# Patient Record
Sex: FEMALE | Race: BLACK OR AFRICAN AMERICAN | Employment: UNEMPLOYED | URBAN - METROPOLITAN AREA
[De-identification: names, ages, dates, MRNs, and addresses within clinical notes are randomized per-mention and may not be internally consistent; named-entity substitution may affect disease eponyms.]

---

## 2018-12-21 ENCOUNTER — APPOINTMENT (OUTPATIENT)
Dept: MRI IMAGING | Age: 43
DRG: 066 | End: 2018-12-21
Attending: NURSE PRACTITIONER
Payer: SELF-PAY

## 2018-12-21 ENCOUNTER — HOSPITAL ENCOUNTER (INPATIENT)
Age: 43
LOS: 3 days | Discharge: HOME HEALTH | DRG: 066 | End: 2018-12-24
Attending: EMERGENCY MEDICINE | Admitting: INTERNAL MEDICINE
Payer: SELF-PAY

## 2018-12-21 ENCOUNTER — APPOINTMENT (OUTPATIENT)
Dept: GENERAL RADIOLOGY | Age: 43
DRG: 066 | End: 2018-12-21
Attending: NURSE PRACTITIONER
Payer: SELF-PAY

## 2018-12-21 DIAGNOSIS — I63.81 LEFT THALAMIC INFARCTION (HCC): ICD-10-CM

## 2018-12-21 DIAGNOSIS — I10 HYPERTENSION, UNSPECIFIED TYPE: Primary | ICD-10-CM

## 2018-12-21 DIAGNOSIS — E87.1 HYPONATREMIA: ICD-10-CM

## 2018-12-21 DIAGNOSIS — E87.6 HYPOKALEMIA: ICD-10-CM

## 2018-12-21 DIAGNOSIS — R20.0 NUMBNESS: ICD-10-CM

## 2018-12-21 LAB
ALBUMIN SERPL-MCNC: 3.4 G/DL (ref 3.4–5)
ALBUMIN/GLOB SERPL: 0.8 {RATIO} (ref 0.8–1.7)
ALP SERPL-CCNC: 84 U/L (ref 45–117)
ALT SERPL-CCNC: 14 U/L (ref 13–56)
ANION GAP SERPL CALC-SCNC: 7 MMOL/L (ref 3–18)
APPEARANCE UR: CLEAR
APTT PPP: 29.2 SEC (ref 23–36.4)
AST SERPL-CCNC: 10 U/L (ref 15–37)
BASOPHILS # BLD: 0 K/UL (ref 0–0.1)
BASOPHILS NFR BLD: 0 % (ref 0–2)
BILIRUB SERPL-MCNC: 0.5 MG/DL (ref 0.2–1)
BILIRUB UR QL: NEGATIVE
BUN SERPL-MCNC: 10 MG/DL (ref 7–18)
BUN/CREAT SERPL: 12
CALCIUM SERPL-MCNC: 8.5 MG/DL (ref 8.5–10.1)
CHLORIDE SERPL-SCNC: 99 MMOL/L (ref 100–108)
CK MB CFR SERPL CALC: NORMAL % (ref 0–4)
CK MB SERPL-MCNC: <1 NG/ML (ref 5–25)
CK SERPL-CCNC: 95 U/L (ref 26–192)
CO2 SERPL-SCNC: 27 MMOL/L (ref 21–32)
COLOR UR: YELLOW
CREAT SERPL-MCNC: 0.81 MG/DL (ref 0.6–1.3)
DIFFERENTIAL METHOD BLD: ABNORMAL
EOSINOPHIL # BLD: 0.1 K/UL (ref 0–0.4)
EOSINOPHIL NFR BLD: 1 % (ref 0–5)
ERYTHROCYTE [DISTWIDTH] IN BLOOD BY AUTOMATED COUNT: 13.9 % (ref 11.6–14.5)
GLOBULIN SER CALC-MCNC: 4.1 G/DL (ref 2–4)
GLUCOSE SERPL-MCNC: 324 MG/DL (ref 74–99)
GLUCOSE UR STRIP.AUTO-MCNC: >1000 MG/DL
HCG UR QL: NEGATIVE
HCT VFR BLD AUTO: 35 % (ref 35–45)
HGB BLD-MCNC: 10.9 G/DL (ref 12–16)
HGB UR QL STRIP: NEGATIVE
INR PPP: 1 (ref 0.8–1.2)
KETONES UR QL STRIP.AUTO: NEGATIVE MG/DL
LEUKOCYTE ESTERASE UR QL STRIP.AUTO: NEGATIVE
LYMPHOCYTES # BLD: 2.8 K/UL (ref 0.9–3.6)
LYMPHOCYTES NFR BLD: 39 % (ref 21–52)
MCH RBC QN AUTO: 22.8 PG (ref 24–34)
MCHC RBC AUTO-ENTMCNC: 31.1 G/DL (ref 31–37)
MCV RBC AUTO: 73.2 FL (ref 74–97)
MONOCYTES # BLD: 0.4 K/UL (ref 0.05–1.2)
MONOCYTES NFR BLD: 6 % (ref 3–10)
NEUTS SEG # BLD: 3.8 K/UL (ref 1.8–8)
NEUTS SEG NFR BLD: 54 % (ref 40–73)
NITRITE UR QL STRIP.AUTO: NEGATIVE
PH UR STRIP: 6.5 [PH] (ref 5–8)
PLATELET # BLD AUTO: 283 K/UL (ref 135–420)
PMV BLD AUTO: 11 FL (ref 9.2–11.8)
POTASSIUM SERPL-SCNC: 3 MMOL/L (ref 3.5–5.5)
PROT SERPL-MCNC: 7.5 G/DL (ref 6.4–8.2)
PROT UR STRIP-MCNC: NEGATIVE MG/DL
PROTHROMBIN TIME: 12.5 SEC (ref 11.5–15.2)
RBC # BLD AUTO: 4.78 M/UL (ref 4.2–5.3)
RBC MORPH BLD: ABNORMAL
RBC MORPH BLD: ABNORMAL
SODIUM SERPL-SCNC: 133 MMOL/L (ref 136–145)
SP GR UR REFRACTOMETRY: 1.02 (ref 1–1.03)
TROPONIN I SERPL-MCNC: <0.02 NG/ML (ref 0–0.04)
UROBILINOGEN UR QL STRIP.AUTO: 0.2 EU/DL (ref 0.2–1)
WBC # BLD AUTO: 7.1 K/UL (ref 4.6–13.2)

## 2018-12-21 PROCEDURE — 83036 HEMOGLOBIN GLYCOSYLATED A1C: CPT

## 2018-12-21 PROCEDURE — 74011250636 HC RX REV CODE- 250/636: Performed by: NURSE PRACTITIONER

## 2018-12-21 PROCEDURE — 82550 ASSAY OF CK (CPK): CPT

## 2018-12-21 PROCEDURE — 65660000000 HC RM CCU STEPDOWN

## 2018-12-21 PROCEDURE — 74011250636 HC RX REV CODE- 250/636: Performed by: EMERGENCY MEDICINE

## 2018-12-21 PROCEDURE — 93005 ELECTROCARDIOGRAM TRACING: CPT

## 2018-12-21 PROCEDURE — 96374 THER/PROPH/DIAG INJ IV PUSH: CPT

## 2018-12-21 PROCEDURE — 96361 HYDRATE IV INFUSION ADD-ON: CPT

## 2018-12-21 PROCEDURE — 71045 X-RAY EXAM CHEST 1 VIEW: CPT

## 2018-12-21 PROCEDURE — 74011250637 HC RX REV CODE- 250/637: Performed by: EMERGENCY MEDICINE

## 2018-12-21 PROCEDURE — 85730 THROMBOPLASTIN TIME PARTIAL: CPT

## 2018-12-21 PROCEDURE — 81003 URINALYSIS AUTO W/O SCOPE: CPT

## 2018-12-21 PROCEDURE — 99285 EMERGENCY DEPT VISIT HI MDM: CPT

## 2018-12-21 PROCEDURE — 70551 MRI BRAIN STEM W/O DYE: CPT

## 2018-12-21 PROCEDURE — 85025 COMPLETE CBC W/AUTO DIFF WBC: CPT

## 2018-12-21 PROCEDURE — 96376 TX/PRO/DX INJ SAME DRUG ADON: CPT

## 2018-12-21 PROCEDURE — 85610 PROTHROMBIN TIME: CPT

## 2018-12-21 PROCEDURE — 80053 COMPREHEN METABOLIC PANEL: CPT

## 2018-12-21 PROCEDURE — 81025 URINE PREGNANCY TEST: CPT

## 2018-12-21 RX ORDER — LABETALOL HCL 20 MG/4 ML
20 SYRINGE (ML) INTRAVENOUS
Status: COMPLETED | OUTPATIENT
Start: 2018-12-21 | End: 2018-12-21

## 2018-12-21 RX ORDER — GUAIFENESIN 100 MG/5ML
324 LIQUID (ML) ORAL
Status: COMPLETED | OUTPATIENT
Start: 2018-12-21 | End: 2018-12-21

## 2018-12-21 RX ORDER — SODIUM CHLORIDE 9 MG/ML
1000 INJECTION, SOLUTION INTRAVENOUS ONCE
Status: COMPLETED | OUTPATIENT
Start: 2018-12-21 | End: 2018-12-21

## 2018-12-21 RX ORDER — LABETALOL HCL 20 MG/4 ML
20 SYRINGE (ML) INTRAVENOUS ONCE
Status: COMPLETED | OUTPATIENT
Start: 2018-12-21 | End: 2018-12-21

## 2018-12-21 RX ADMIN — SODIUM CHLORIDE 1000 ML: 900 INJECTION, SOLUTION INTRAVENOUS at 20:40

## 2018-12-21 RX ADMIN — POTASSIUM BICARBONATE 50 MEQ: 25 TABLET, EFFERVESCENT ORAL at 20:39

## 2018-12-21 RX ADMIN — ASPIRIN 81 MG 324 MG: 81 TABLET ORAL at 21:12

## 2018-12-21 RX ADMIN — LABETALOL HYDROCHLORIDE 20 MG: 5 INJECTION, SOLUTION INTRAVENOUS at 21:18

## 2018-12-21 RX ADMIN — LABETALOL HYDROCHLORIDE 20 MG: 5 INJECTION, SOLUTION INTRAVENOUS at 18:48

## 2018-12-21 NOTE — ED NOTES
Patient states she has nose piercing that cannot be removed, information noted on MRI check list, MRI called to notify them of nose piercing, no answer on MRI phone number provided by , will continue to attempt

## 2018-12-21 NOTE — ED PROVIDER NOTES
EMERGENCY DEPARTMENT HISTORY AND PHYSICAL EXAM    Date: 12/21/2018  Patient Name: Salma Combs    History of Presenting Illness     Chief Complaint   Patient presents with    Numbness         History Provided By: Patient    Chief Complaint: Numbness  Duration: 1 Days  Timing:  Acute  Location: right hand and foot  Quality: numbness  Associated Symptoms: right facial numbness at her lips    Additional History (Context):   5:30 PM  Salma Combs is a 39 y.o. female with PMHX of diabetes and HTN who presents to the emergency department C/O right foot and hand numbness onset 23.5 hours ago at 6 PM. Associated sxs include right facial numbness at her lips. Patient reports she has not taken her blood pressure mediation in 8 months secondary to insurance issues and recently moving here. Endorses social drinking. Pt denies weakness, chest pain, SOB, any pain, tobacco use, and any other sxs or complaints. PCP: None        Past History     Past Medical History:  Past Medical History:   Diagnosis Date    Diabetes ([address]     Hypercholesteremia     Hypertension        Past Surgical History:  Past Surgical History:   Procedure Laterality Date    HX GYN      c section       Family History:  History reviewed. No pertinent family history. Social History:  Social History     Tobacco Use    Smoking status: Never Smoker    Smokeless tobacco: Never Used   Substance Use Topics    Alcohol use: Yes     Comment: socially    Drug use: No       Allergies:  No Known Allergies      Review of Systems   Review of Systems   Respiratory: Negative for shortness of breath. Cardiovascular: Negative for chest pain. Neurological: Positive for numbness (right hand/foot, facial near lips). Negative for weakness. All other systems reviewed and are negative.       Physical Exam     Vitals:    12/21/18 2349 12/22/18 0139 12/22/18 0349 12/22/18 0540   BP: (!) 206/104 (!) 188/106 (!) 183/107 (!) 190/109   Pulse: 86 91 99 84   Resp: 16 16 16 16   Temp: 98.5 °F (36.9 °C) 98.6 °F (37 °C) 98.2 °F (36.8 °C) 98.3 °F (36.8 °C)   SpO2: 100%   98%   Weight:       Height:         Physical Exam   Constitutional: She is oriented to person, place, and time. She appears well-developed and well-nourished. Answering questions appropriately. Chronically ill appearing, NAD   HENT:   Head: Normocephalic and atraumatic. Eyes: Conjunctivae and EOM are normal. Pupils are equal, round, and reactive to light. Neck: Normal range of motion. Neck supple. Cardiovascular: Normal rate and regular rhythm. No murmur heard. Pulmonary/Chest: Effort normal and breath sounds normal.   Abdominal: Soft. Bowel sounds are normal. There is no tenderness. There is no rebound and no guarding. Musculoskeletal: Normal range of motion. Strong equal strength in bilateral upper and lower extremities. Admits to \"decreased sensation right hand and right foot\", sensation intact but reports \"feels weird and different\"  Negative upper and lower pronator drift. No facial droop noted. Negative finger to nose. Neurological: She is alert and oriented to person, place, and time. No cranial nerve deficit. Coordination normal.   Skin: Skin is warm and dry. Nursing note and vitals reviewed. Diagnostic Study Results     Labs -     Recent Results (from the past 12 hour(s))   GLUCOSE, POC    Collection Time: 12/22/18  5:39 AM   Result Value Ref Range    Glucose (POC) 303 (H) 70 - 110 mg/dL       Radiologic Studies -   XR CHEST PORT   Final Result   Impression:   --------------      No active pulmonary disease.       MRI BRAIN WO CONT    (Results Pending)   CTA NECK    (Results Pending)   CTA HEAD    (Results Pending)     10:31 PM  RADIOLOGY FINDINGS  Chest X-ray shows NAP  Pending review by Radiologist  Recorded by MELISSA Arvizuibomar, as dictated by Amado Kaplan MD      CT Results  (Last 48 hours)    None        CXR Results  (Last 48 hours)               12/21/18 1802  XR CHEST PORT Final result    Impression:  Impression:   --------------       No active pulmonary disease. Narrative:  ---------------------------------------------------------------------------   <<<<<<<<<           [facility]           >>>>>>>>>    ---------------------------------------------------------------------------       CLINICAL HISTORY:  Hypertension. Pain. COMPARISON EXAMINATIONS:  None. ---  SINGLE FRONTAL VIEW OF THE CHEST  ---       The lungs and pleural spaces are clear. The mediastinum is unremarkable in   appearance. No significant osseous abnormalities are identified.             --------------             Medications given in the ED-  Medications   influenza vaccine 2018-19 (6 mos+)(PF) (FLUARIX QUAD/FLULAVAL QUAD) injection 0.5 mL (not administered)   sodium chloride (NS) flush 5-10 mL (10 mL IntraVENous Given 12/22/18 0659)   sodium chloride (NS) flush 5-10 mL (not administered)   insulin lispro (HUMALOG) injection (8 Units SubCUTAneous Given 12/22/18 0655)   glucose chewable tablet 16 g (not administered)   glucagon (GLUCAGEN) injection 1 mg (not administered)   dextrose (D50W) injection syrg 12.5-25 g (not administered)   labetalol (NORMODYNE;TRANDATE) 20 mg/4 mL (5 mg/mL) injection 10 mg (not administered)   atorvastatin (LIPITOR) tablet 40 mg (not administered)   labetalol (NORMODYNE;TRANDATE) 20 mg/4 mL (5 mg/mL) injection 20 mg (20 mg IntraVENous Given 12/21/18 8578)   0.9% sodium chloride infusion 1,000 mL (0 mL IntraVENous IV Completed 12/21/18 2200)   potassium bicarbonate (KLYTE) tablet 50 mEq (50 mEq Oral Given 12/21/18 2039)   aspirin chewable tablet 324 mg (324 mg Oral Given 12/21/18 2112)   labetalol (NORMODYNE;TRANDATE) 20 mg/4 mL (5 mg/mL) injection 20 mg (20 mg IntraVENous Given 12/21/18 2118)         Medical Decision Making   I am the first provider for this patient.     I reviewed the vital signs, available nursing notes, past medical history, past surgical history, family history and social history. Vital Signs-Reviewed the patient's vital signs. Pulse Oximetry Analysis - 100% on RA     Cardiac Monitor:  Rate: 106 bpm  Rhythm: Sinus tachycardia    EKG interpretation: (Preliminary)  6:00 PM  94 BPM, NSR, no STEMi  EKG read by Yuridia DARLING at 6:06 PM     Records Reviewed: Nursing Notes and Old Medical Records    Provider Notes (Medical Decision Making): 50F. o female presents to the ED c/o right hand and foot \"numbness\" since yesterday. She has been out of her BP medications for 8 months and is unsure of the medications she is supposed to be on. No neuro-focal deficit on exam, discussed case with neuro who recommends MRI of the brain to r/o stroke and to treat BP. BP is decreasing after Labetolol. Care turned over to attending at change of shift to re-evaluate patient and wait for MRI results. Procedures:  Procedures    ED Course:   5:30 PM Initial assessment performed. The patients presenting problems have been discussed, and they are in agreement with the care plan formulated and outlined with them. I have encouraged them to ask questions as they arise throughout their visit. 6:00 PM Discussed patient's history, exam, and available diagnostics results with Orma Seip, MD, Neurology, who states to obtain MRI and if negative just  treat blood pressure. SIGN OUT:  8:13 PM  Patient's presentation, labs/imaging and plan of care was reviewed with Raji De La O MD as part of sign out. They will wait for MRI results as part of the plan discussed with the patient. Raji De La O MD's assistance in completion of this plan is greatly appreciated but it should be noted that I will be the provider of record for this patient. LISSET Wylie    9:21 PM Discussed patient's history, exam, and available diagnostics results with Kalen Mcneill DO, hospitalist, who agrees to admit.     Diagnosis and Disposition       Critical Care Time: None    Core Measures:  For Hospitalized Patients:    1. Hospitalization Decision Time:  The decision to hospitalize the patient was made by Nabor Chapin MD at 9:21 PM on 12/21/2018    2. Aspirin: Aspirin was given    9:21 PM  Patient is being admitted to the hospital by Vivian Cazares DO. The results of their tests and reasons for their admission have been discussed with them and/or available family. They convey agreement and understanding for the need to be admitted and for their admission diagnosis. CONDITIONS ON ADMISSION:  Sepsis is not present at the time of admission. Deep Vein Thrombosis is not present at the time of admission. Thrombosis is present at the time of admission. Urinary Tract Infection is not present at the time of admission. Pneumonia is not present at the time of admission. MRSA is not present at the time of admission. Wound infection is not present at the time of admission. Pressure Ulcer is not present at the time of admission. CLINICAL IMPRESSION:    1. Hypertension, unspecified type    2. Numbness    3. Left thalamic infarction ([address]    4. Hyponatremia    5. Hypokalemia        PLAN:  1. Admit to telemetry  _______________________________    Attestations: This note is prepared by Angela Bar and Catalina Woodall, acting as Scribe for [facility]. [facility]:  The scribe's documentation has been prepared under my direction and personally reviewed by me in its entirety. I confirm that the note above accurately reflects all work, treatment, procedures, and medical decision making performed by me. This note is prepared by Ozzie Preciado, acting as Scribe for Nabor Chapin MD.    Nabor Chapin MD:  The scribe's documentation has been prepared under my direction and personally reviewed by me in its entirety.   I confirm that the note above accurately reflects all work, treatment, procedures, and medical decision making performed by me.  _______________________________

## 2018-12-22 ENCOUNTER — APPOINTMENT (OUTPATIENT)
Dept: CT IMAGING | Age: 43
DRG: 066 | End: 2018-12-22
Attending: INTERNAL MEDICINE
Payer: SELF-PAY

## 2018-12-22 LAB
ANION GAP SERPL CALC-SCNC: 8 MMOL/L (ref 3–18)
BUN SERPL-MCNC: 10 MG/DL (ref 7–18)
BUN/CREAT SERPL: 14
CALCIUM SERPL-MCNC: 8.5 MG/DL (ref 8.5–10.1)
CHLORIDE SERPL-SCNC: 103 MMOL/L (ref 100–108)
CHOLEST SERPL-MCNC: 190 MG/DL
CO2 SERPL-SCNC: 24 MMOL/L (ref 21–32)
CREAT SERPL-MCNC: 0.71 MG/DL (ref 0.6–1.3)
EST. AVERAGE GLUCOSE BLD GHB EST-MCNC: 309 MG/DL
GLUCOSE BLD STRIP.AUTO-MCNC: 205 MG/DL (ref 70–110)
GLUCOSE BLD STRIP.AUTO-MCNC: 303 MG/DL (ref 70–110)
GLUCOSE BLD STRIP.AUTO-MCNC: 338 MG/DL (ref 70–110)
GLUCOSE SERPL-MCNC: 205 MG/DL (ref 74–99)
HBA1C MFR BLD: 12.4 % (ref 4.2–5.6)
HDLC SERPL-MCNC: 57 MG/DL (ref 40–60)
HDLC SERPL: 3.3 {RATIO} (ref 0–5)
LDLC SERPL CALC-MCNC: 113.2 MG/DL (ref 0–100)
LIPID PROFILE,FLP: ABNORMAL
POTASSIUM SERPL-SCNC: 3.8 MMOL/L (ref 3.5–5.5)
SODIUM SERPL-SCNC: 135 MMOL/L (ref 136–145)
TRIGL SERPL-MCNC: 99 MG/DL (ref ?–150)
VLDLC SERPL CALC-MCNC: 19.8 MG/DL

## 2018-12-22 PROCEDURE — 74011636637 HC RX REV CODE- 636/637: Performed by: INTERNAL MEDICINE

## 2018-12-22 PROCEDURE — 82962 GLUCOSE BLOOD TEST: CPT

## 2018-12-22 PROCEDURE — 65660000000 HC RM CCU STEPDOWN

## 2018-12-22 PROCEDURE — 70496 CT ANGIOGRAPHY HEAD: CPT

## 2018-12-22 PROCEDURE — 80048 BASIC METABOLIC PNL TOTAL CA: CPT

## 2018-12-22 PROCEDURE — 74011636320 HC RX REV CODE- 636/320: Performed by: INTERNAL MEDICINE

## 2018-12-22 PROCEDURE — 97161 PT EVAL LOW COMPLEX 20 MIN: CPT

## 2018-12-22 PROCEDURE — 74011250637 HC RX REV CODE- 250/637: Performed by: PSYCHIATRY & NEUROLOGY

## 2018-12-22 PROCEDURE — 36415 COLL VENOUS BLD VENIPUNCTURE: CPT

## 2018-12-22 PROCEDURE — 74011250637 HC RX REV CODE- 250/637: Performed by: INTERNAL MEDICINE

## 2018-12-22 PROCEDURE — 80061 LIPID PANEL: CPT

## 2018-12-22 RX ORDER — ASPIRIN 81 MG/1
81 TABLET ORAL DAILY
Status: DISCONTINUED | OUTPATIENT
Start: 2018-12-22 | End: 2018-12-24 | Stop reason: HOSPADM

## 2018-12-22 RX ORDER — LISINOPRIL 20 MG/1
20 TABLET ORAL DAILY
Status: DISCONTINUED | OUTPATIENT
Start: 2018-12-22 | End: 2018-12-24 | Stop reason: HOSPADM

## 2018-12-22 RX ORDER — ATORVASTATIN CALCIUM 20 MG/1
40 TABLET, FILM COATED ORAL DAILY
Status: DISCONTINUED | OUTPATIENT
Start: 2018-12-22 | End: 2018-12-24 | Stop reason: HOSPADM

## 2018-12-22 RX ORDER — INSULIN LISPRO 100 [IU]/ML
INJECTION, SOLUTION INTRAVENOUS; SUBCUTANEOUS
Status: DISCONTINUED | OUTPATIENT
Start: 2018-12-22 | End: 2018-12-24 | Stop reason: HOSPADM

## 2018-12-22 RX ORDER — DEXTROSE 50 % IN WATER (D50W) INTRAVENOUS SYRINGE
25-50 AS NEEDED
Status: DISCONTINUED | OUTPATIENT
Start: 2018-12-22 | End: 2018-12-24 | Stop reason: HOSPADM

## 2018-12-22 RX ORDER — SODIUM CHLORIDE 0.9 % (FLUSH) 0.9 %
5-10 SYRINGE (ML) INJECTION AS NEEDED
Status: DISCONTINUED | OUTPATIENT
Start: 2018-12-22 | End: 2018-12-24 | Stop reason: HOSPADM

## 2018-12-22 RX ORDER — LABETALOL HCL 20 MG/4 ML
10 SYRINGE (ML) INTRAVENOUS
Status: DISCONTINUED | OUTPATIENT
Start: 2018-12-22 | End: 2018-12-24 | Stop reason: HOSPADM

## 2018-12-22 RX ORDER — MAGNESIUM SULFATE 100 %
4 CRYSTALS MISCELLANEOUS AS NEEDED
Status: DISCONTINUED | OUTPATIENT
Start: 2018-12-22 | End: 2018-12-24 | Stop reason: HOSPADM

## 2018-12-22 RX ORDER — SODIUM CHLORIDE 0.9 % (FLUSH) 0.9 %
5-10 SYRINGE (ML) INJECTION EVERY 8 HOURS
Status: DISCONTINUED | OUTPATIENT
Start: 2018-12-22 | End: 2018-12-24 | Stop reason: HOSPADM

## 2018-12-22 RX ADMIN — Medication 10 ML: at 06:59

## 2018-12-22 RX ADMIN — LISINOPRIL 20 MG: 20 TABLET ORAL at 12:09

## 2018-12-22 RX ADMIN — Medication 10 ML: at 21:24

## 2018-12-22 RX ADMIN — INSULIN LISPRO 8 UNITS: 100 INJECTION, SOLUTION INTRAVENOUS; SUBCUTANEOUS at 06:55

## 2018-12-22 RX ADMIN — ATORVASTATIN CALCIUM 40 MG: 20 TABLET, FILM COATED ORAL at 08:09

## 2018-12-22 RX ADMIN — Medication 10 ML: at 14:02

## 2018-12-22 RX ADMIN — INSULIN LISPRO 8 UNITS: 100 INJECTION, SOLUTION INTRAVENOUS; SUBCUTANEOUS at 21:23

## 2018-12-22 RX ADMIN — ASPIRIN 81 MG: 81 TABLET, COATED ORAL at 12:09

## 2018-12-22 RX ADMIN — Medication 10 ML: at 01:00

## 2018-12-22 RX ADMIN — INSULIN LISPRO 4 UNITS: 100 INJECTION, SOLUTION INTRAVENOUS; SUBCUTANEOUS at 12:10

## 2018-12-22 RX ADMIN — IOPAMIDOL 100 ML: 755 INJECTION, SOLUTION INTRAVENOUS at 16:51

## 2018-12-22 NOTE — ROUTINE PROCESS
Bedside and Verbal shift change report given to Blossom Queen RN (oncoming nurse) by Jan Michelle RN   (offgoing nurse). Report included the following information SBAR, Kardex, ED Summary, Intake/Output, MAR, Recent Results and Med Rec Status.

## 2018-12-22 NOTE — ED NOTES
Telephone conversation with Jannette Ravi MRI tech, Jannette Ravi notified that patient unable to remove nose piercing, Jannette Ravi states she will come and evaluate patient

## 2018-12-22 NOTE — PROGRESS NOTES
Hospitalist Progress Note    Patient: Toña Frazier MRN: [id number]  CSN: [id number]    YOB: 1975  Age: 39 y.o. Sex: female    DOA: 12/21/2018 LOS:  LOS: 1 day              IMPRESSION and Plan:    Toña Frazier is a 39 y.o. female with   Patient Active Problem List    Diagnosis Date Noted    Left thalamic infarction ([facility] [address] 12/21/2018     Active Problems:    Left thalamic infarction ([facility] [address] (12/21/2018)    1. Acute Left thalamic infarct  2. HTN  3. Dm2  4. Hypokalemia       Plan:  D/e Dr. Melinda Mc-- appriciate his assistant  CVA w/u in progress  PT/OT/ST      Patient's condition is fair        Recommend to continue hospitalization. Discussed with patient. Chief Complaints:   Chief Complaint   Patient presents with    Numbness     SUBJECTIVE:  Pt is seen and examined. Chart reviewed. stil co r sided weakness and numbness    No CP or SOB  No Fever, chills, Nausea, vomitting. Review of systems:    General: No fevers or chills. Cardiovascular: No chest pain or pressure. No palpitations. Pulmonary: no shortness of breath.    Gastrointestinal: No nausea, vomiting    PE:  Patient Vitals for the past 24 hrs:   BP Temp Pulse Resp SpO2 Height Weight   12/22/18 1000 (!) 185/100 98.3 °F (36.8 °C) 82 16 99 %     12/22/18 0800 (!) 191/110 98.1 °F (36.7 °C) 86 16 97 %     12/22/18 0540 (!) 190/109 98.3 °F (36.8 °C) 84 16 98 %     12/22/18 0349 (!) 183/107 98.2 °F (36.8 °C) 99 16      12/22/18 0139 (!) 188/106 98.6 °F (37 °C) 91 16      12/21/18 2349 (!) 206/104 98.5 °F (36.9 °C) 86 16 100 %     12/21/18 2233 (!) 189/107 98.7 °F (37.1 °C) 91 14 100 %     12/21/18 2145 (!) 185/102  88 20 99 %     12/21/18 2120 (!) 192/105  87 19 100 %     12/21/18 2118 (!) 206/112  85       12/21/18 2100 (!) 189/98  82 20 100 %     12/21/18 1930 (!) 160/91  89 19 100 %     12/21/18 1915 (!) 179/101  90 18 100 %     12/21/18 1900 (!) 185/106  89 14 100 %     12/21/18 1832 (!) 206/111  92 16 100 %     12/21/18 1716 (!) 229/126 98.9 °F (37.2 °C) (!) 104 18 100 % 5' 1\" (1.549 m) 72.6 kg (160 lb)       Intake/Output Summary (Last 24 hours) at 12/22/2018 1127  Last data filed at 12/22/2018 0811  Gross per 24 hour   Intake 1240 ml   Output    Net 1240 ml     Patient Vitals for the past 120 hrs:   Weight   12/21/18 1716 72.6 kg (160 lb)           HEENT: Perrla, EOMI; oral mucosa well prefused; Conjunctiva not injected  Neck: No JVD, Negative carotid bruits, normal pulses; No thyromegaly  Resp: CTA bilaterally; No wheezes or rales  CV: RRR s1s2 No murmur; No rubs; PMI not displaced  Abd: Positive Bowel Sounds, Soft, Nontender  Ext: No clubbing; No cyanosis;  No edema  Neuro: Alert and oriented; Nonfocal  Skin: Warm, Dry, Intact  Pulses: 2+ DP/PT/Rad      Intake and Output:  Current Shift:  12/22 0701 - 12/22 1900  In: 240 [P.O.:240]  Out: -   Last three shifts:  12/20 1901 - 12/22 0700  In: 1000 [I.V.:1000]  Out: -     Lab/Data Reviewed:  Recent Results (from the past 8 hour(s))   GLUCOSE, POC    Collection Time: 12/22/18  5:39 AM   Result Value Ref Range    Glucose (POC) 303 (H) 70 - 110 mg/dL     Medications:  Current Facility-Administered Medications   Medication Dose Route Frequency    sodium chloride (NS) flush 5-10 mL  5-10 mL IntraVENous Q8H    sodium chloride (NS) flush 5-10 mL  5-10 mL IntraVENous PRN    insulin lispro (HUMALOG) injection   SubCUTAneous AC&HS    glucose chewable tablet 16 g  4 Tab Oral PRN    glucagon (GLUCAGEN) injection 1 mg  1 mg IntraMUSCular PRN    dextrose (D50W) injection syrg 12.5-25 g  25-50 mL IntraVENous PRN    labetalol (NORMODYNE;TRANDATE) 20 mg/4 mL (5 mg/mL) injection 10 mg  10 mg IntraVENous Q4H PRN    atorvastatin (LIPITOR) tablet 40 mg  40 mg Oral DAILY    aspirin delayed-release tablet 81 mg  81 mg Oral DAILY    lisinopril (PRINIVIL, ZESTRIL) tablet 20 mg  20 mg Oral DAILY    influenza vaccine 2018-19 (6 mos+)(PF) (FLUARIX QUAD/FLULAVAL QUAD) injection 0.5 mL  0.5 mL IntraMUSCular PRIOR TO DISCHARGE       Recent Results (from the past 24 hour(s))   CBC WITH AUTOMATED DIFF    Collection Time: 12/21/18  5:32 PM   Result Value Ref Range    WBC 7.1 4.6 - 13.2 K/uL    RBC 4.78 4.20 - 5.30 M/uL    HGB 10.9 (L) 12.0 - 16.0 g/dL    HCT 35.0 35.0 - 45.0 %    MCV 73.2 (L) 74.0 - 97.0 FL    MCH 22.8 (L) 24.0 - 34.0 PG    MCHC 31.1 31.0 - 37.0 g/dL    RDW 13.9 11.6 - 14.5 %    PLATELET 317 180 - 863 K/uL    MPV 11.0 9.2 - 11.8 FL    NEUTROPHILS 54 40 - 73 %    LYMPHOCYTES 39 21 - 52 %    MONOCYTES 6 3 - 10 %    EOSINOPHILS 1 0 - 5 %    BASOPHILS 0 0 - 2 %    ABS. NEUTROPHILS 3.8 1.8 - 8.0 K/UL    ABS. LYMPHOCYTES 2.8 0.9 - 3.6 K/UL    ABS. MONOCYTES 0.4 0.05 - 1.2 K/UL    ABS. EOSINOPHILS 0.1 0.0 - 0.4 K/UL    ABS. BASOPHILS 0.0 0.0 - 0.1 K/UL    RBC COMMENTS POLYCHROMASIA  1+        RBC COMMENTS OVALOCYTES  1+        DF AUTOMATED     METABOLIC PANEL, COMPREHENSIVE    Collection Time: 12/21/18  5:32 PM   Result Value Ref Range    Sodium 133 (L) 136 - 145 mmol/L    Potassium 3.0 (L) 3.5 - 5.5 mmol/L    Chloride 99 (L) 100 - 108 mmol/L    CO2 27 21 - 32 mmol/L    Anion gap 7 3.0 - 18 mmol/L    Glucose 324 (H) 74 - 99 mg/dL    BUN 10 7.0 - 18 MG/DL    Creatinine 0.81 0.6 - 1.3 MG/DL    BUN/Creatinine ratio 12      GFR est AA >60 >60 ml/min/1.73m2    GFR est non-AA >60 >60 ml/min/1.73m2    Calcium 8.5 8.5 - 10.1 MG/DL    Bilirubin, total 0.5 0.2 - 1.0 MG/DL    ALT (SGPT) 14 13 - 56 U/L    AST (SGOT) 10 (L) 15 - 37 U/L    Alk.  phosphatase 84 45 - 117 U/L    Protein, total 7.5 6.4 - 8.2 g/dL    Albumin 3.4 3.4 - 5.0 g/dL    Globulin 4.1 (H) 2.0 - 4.0 g/dL    A-G Ratio 0.8 0.8 - 1.7     CARDIAC PANEL,(CK, CKMB & TROPONIN)    Collection Time: 12/21/18  5:32 PM   Result Value Ref Range    CK 95 26 - 192 U/L    CK - MB <1.0 <3.6 ng/ml    CK-MB Index  0.0 - 4.0 %     CALCULATION NOT PERFORMED WHEN RESULT IS BELOW LINEAR LIMIT    Troponin-I, QT <0.02 0.0 - 0.045 NG/ML   PTT Collection Time: 12/21/18  5:32 PM   Result Value Ref Range    aPTT 29.2 23.0 - 36.4 SEC   PROTHROMBIN TIME + INR    Collection Time: 12/21/18  5:32 PM   Result Value Ref Range    Prothrombin time 12.5 11.5 - 15.2 sec    INR 1.0 0.8 - 1.2     HEMOGLOBIN A1C WITH EAG    Collection Time: 12/21/18  5:32 PM   Result Value Ref Range    Hemoglobin A1c 12.4 (H) 4.2 - 5.6 %    Est. average glucose 309 mg/dL   EKG, 12 LEAD, INITIAL    Collection Time: 12/21/18  6:00 PM   Result Value Ref Range    Ventricular Rate 94 BPM    Atrial Rate 94 BPM    P-R Interval 174 ms    QRS Duration 86 ms    Q-T Interval 366 ms    QTC Calculation (Bezet) 457 ms    Calculated P Axis 63 degrees    Calculated R Axis 3 degrees    Calculated T Axis 43 degrees    Diagnosis       Normal sinus rhythm  Possible Anterior infarct , age undetermined  Abnormal ECG  No previous ECGs available     URINALYSIS W/ RFLX MICROSCOPIC    Collection Time: 12/21/18  6:05 PM   Result Value Ref Range    Color YELLOW      Appearance CLEAR      Specific gravity 1.022 1.005 - 1.030      pH (UA) 6.5 5.0 - 8.0      Protein NEGATIVE  NEG mg/dL    Glucose >1,000 (A) NEG mg/dL    Ketone NEGATIVE  NEG mg/dL    Bilirubin NEGATIVE  NEG      Blood NEGATIVE  NEG      Urobilinogen 0.2 0.2 - 1.0 EU/dL    Nitrites NEGATIVE  NEG      Leukocyte Esterase NEGATIVE  NEG     HCG URINE, QL. - POC    Collection Time: 12/21/18  6:15 PM   Result Value Ref Range    Pregnancy test,urine (POC) NEGATIVE  NEG     GLUCOSE, POC    Collection Time: 12/22/18  5:39 AM   Result Value Ref Range    Glucose (POC) 303 (H) 70 - 110 mg/dL       Procedures/imaging: see electronic medical records for all procedures/Xrays and details which were not copied into this note but were reviewed prior to creation of Alex Wheeler MD   12/22/2018, 11:27 AM

## 2018-12-22 NOTE — ROUTINE PROCESS
TRANSFER - IN REPORT:    Verbal report received from Nadia Moore RN(name) on Cassie Sanchez  being received from ED(unit) for routine progression of care      Report consisted of patients Situation, Background, Assessment and   Recommendations(SBAR). Information from the following report(s) SBAR, Kardex, ED Summary, Intake/Output, MAR, Recent Results and Med Rec Status was reviewed with the receiving nurse. Opportunity for questions and clarification was provided. Assessment completed upon patients arrival to unit and care assumed.

## 2018-12-22 NOTE — ED NOTES
Pt hourly rounding competed. Safety   Pt (X) resting on stretcher with side rails up and call bell in reach. () in chair    () in parents arms. Toileting   Pt offered ()Bedpan     ()Assistance to Restroom     ()Urinal  Ongoing Updates  Updated on plan of care and status of test results.   Pain Management  Inquired as to comfort and offered comfort measures:    () warm blankets   () dimmed lights

## 2018-12-22 NOTE — PROGRESS NOTES
2349  Vitals obtained. BP elevated. Dr. Azeb Mcleod aware. Shift uneventful. Pt is stable with no signs of distress noted.

## 2018-12-22 NOTE — PROGRESS NOTES
Problem: Falls - Risk of  Goal: *Absence of Falls  Document Deepa Fall Risk and appropriate interventions in the flowsheet.   Outcome: Progressing Towards Goal  Fall Risk Interventions:            Medication Interventions: Assess postural VS orthostatic hypotension, Patient to call before getting OOB, Teach patient to arise slowly

## 2018-12-22 NOTE — PROGRESS NOTES
Speech Pathology Note    Noted  New order for swallow eval, chart reviewed, spoke with nurse, Maria Guadalupe. Pt currently NPO for CTA. Prior to that pt was on regular diet which she was tolerating without difficulty per nurse, tolerating meds without difficulty as well. In-house SLP will f/u next business day, or sooner if difficulties arise.   Thank you for this referral.    Barbara Reid MS, CCC/SLP  Speech- Language Pathologist

## 2018-12-22 NOTE — ROUTINE PROCESS
TRANSFER - OUT REPORT:    Verbal report given to Maru Álvarez RN(name) on Arthur Rodriguez  being transferred to (unit) for routine progression of care       Report consisted of patients Situation, Background, Assessment and   Recommendations(SBAR). Information from the following report(s) SBAR, ED Summary and MAR was reviewed with the receiving nurse. Lines:   Peripheral IV 12/21/18 Left Antecubital (Active)   Site Assessment Clean, dry, & intact 12/21/2018  5:32 PM   Phlebitis Assessment 0 12/21/2018  5:32 PM   Infiltration Assessment 4 12/21/2018  5:32 PM   Dressing Type Transparent 12/21/2018  5:32 PM   Hub Color/Line Status Pink;Patent 12/21/2018  5:32 PM   Action Taken Blood drawn 12/21/2018  5:32 PM   Alcohol Cap Used Yes 12/21/2018  5:32 PM        Opportunity for questions and clarification was provided.       Patient transported with:   Monitor  Registered Nurse

## 2018-12-22 NOTE — H&P
History & Physical    Patient: Citlaly Sorensen MRN: [id number]  [facility]: [id number]    YOB: 1975  Age: 39 y.o. Sex: female      DOA: 12/21/2018  Primary Care Provider:  None      Assessment/Plan     1. Acute Left thalamic infarct  2. HTN  3. Dm2  4. Hypokalemia      PLAN:  - Admit to medical service with telemetry monitoring  - start aspirin, statin  - obtain 2d echo, cta head/ neck  - consult neurology  - neurochecks per protocol  - permissive hypertension, treat for SBP>180  - monitor accucheck and utilize correction insulin as required  - full code,dvt ppx SCDs    Patient Active Problem List   Diagnosis Code    Left thalamic infarction ([facility] I63.9     HPI:   Erick Alvarez is a 39 y.o. female with past medical history significant for Jorge Benz 81 presents with acute onset of numbness of her R foot & hand. Her symptoms started yesterday and hve not abated. She denies weakness, dysarthria, facial numbness, dysarthria of vision changes. She reports she has been off her medications for quite some time due to financial constraints. On presentation to the Er she was hypertensive with out focal neurologic deficits. Labs w hypokalemia. Exam with out focal deficits. She had MRI of brain w acute Left thalamic infarct. Medicine is asked to admit for further management. Past Medical History:   Diagnosis Date    Diabetes ([facility] [address]     Hypercholesteremia     Hypertension      Past Surgical History:   Procedure Laterality Date    HX GYN      c section      Social History     Tobacco Use    Smoking status: Never Smoker    Smokeless tobacco: Never Used   Substance Use Topics    Alcohol use: Yes     Comment: socially    Drug use: No     History reviewed. No pertinent family history. No current facility-administered medications on file prior to encounter. No current outpatient medications on file prior to encounter.       No Known Allergies      Review of Systems  Constitutional: No fever, chills, diaphoresis, malaise, fatigue or weight gain/loss or falls  Skin: no itching or rashes  HEENT: no neck stiffness, hearing loss, tinnitus, epistaxis or sore throat  EYES: no blurry vision, double vision or photophobia  CARDIOVASCULAR: no, cp, palpitations, orthopnea, pnd or LE edema  PULMONARY: no cough, wheeze, shortness of breath or sputum production  GI: no nausea, vomiting, diarrhea, abdominal pain, melena, hematemesis or brbpr  : no dysuria, hematuria  MUSCULOSKELETAL: no back pain, joint pain or myalgias  ENDOCRINE: no heat/cold intolerance, polyuria or polydipsia  HEME: no easy bruising or bleeding  NEURO: + unilateral weakness, -numbness,- tingling or seizures      Physical Exam:        Visit Vitals  BP (!) 206/104 (BP 1 Location: Right arm, BP Patient Position: At rest)   Pulse 86   Temp 98.5 °F (36.9 °C)   Resp 16   Ht 5' 1\" (1.549 m)   Wt 72.6 kg (160 lb)   LMP 2018   SpO2 100%   Breastfeeding? No   BMI 30.23 kg/m²      O2 Device: Room air    Temp (24hrs), Av.7 °F (37.1 °C), Min:98.5 °F (36.9 °C), Max:98.9 °F (37.2 °C)     1901 -  0700  In: 1000 [I.V.:1000]  Out: -    No intake/output data recorded. Body mass index is 30.23 kg/m². General:  Awake, cooperative, no distress. Head:  Normocephalic, without obvious abnormality, atraumatic. Eyes:  Conjunctivae/corneas clear, sclera anicteric, PERRL, EOMs intact. Nose: Nares normal. No drainage or sinus tenderness. Throat: Lips, mucosa, and tongue normal. .   Neck: Supple, symmetrical, trachea midline, no adenopathy. Lungs:   Clear to auscultation bilaterally, equal expansion       Heart:  Regular rate and rhythm, S1, S2 normal, no murmur, click, rub or gallop, cap refill normal      Abdomen: Soft, non-tender. Bowel sounds normal. No masses,  No organomegaly. Extremities: Extremities normal, atraumatic, no cyanosis or edema. Pulses: 2+ and symmetric all extremities.    Skin: Skin color pale, texture, turgor normal. No rashes or lesions   Neurologic: CNII-XII intact. No focal motor or sensory deficit.            Laboratory Studies:    CMP:   Lab Results   Component Value Date/Time     (L) 12/21/2018 05:32 PM    K 3.0 (L) 12/21/2018 05:32 PM    CL 99 (L) 12/21/2018 05:32 PM    CO2 27 12/21/2018 05:32 PM    AGAP 7 12/21/2018 05:32 PM     (H) 12/21/2018 05:32 PM    BUN 10 12/21/2018 05:32 PM    CREA 0.81 12/21/2018 05:32 PM    GFRAA >60 12/21/2018 05:32 PM    GFRNA >60 12/21/2018 05:32 PM    CA 8.5 12/21/2018 05:32 PM    ALB 3.4 12/21/2018 05:32 PM    TP 7.5 12/21/2018 05:32 PM    GLOB 4.1 (H) 12/21/2018 05:32 PM    AGRAT 0.8 12/21/2018 05:32 PM    SGOT 10 (L) 12/21/2018 05:32 PM    ALT 14 12/21/2018 05:32 PM     CBC:   Lab Results   Component Value Date/Time    WBC 7.1 12/21/2018 05:32 PM    HGB 10.9 (L) 12/21/2018 05:32 PM    HCT 35.0 12/21/2018 05:32 PM     12/21/2018 05:32 PM       Imaging studies personally reviewed:  MRI brain:  Left thalamic infarct      Shi Mcgarry DO  Internal Medicine/Geriatrics

## 2018-12-22 NOTE — PROGRESS NOTES
Bedside shift change report given to Joseluis Lucas [id number] (oncoming nurse) by Chi Devlin RN (offgoing nurse). Report included the following information SBAR, Kardex, ED Summary, Intake/Output, MAR, Accordion and Alarm Parameters . 0800 Assessment complete. Pt still has numbness on right arm, right leg, and right side of face. Pt is alert and oriented. No complaints of pain. Shift Summary: Shift uneventful. No complaints of chest pain or shortness of breath. Call light is within reach.

## 2018-12-22 NOTE — PROGRESS NOTES
Problem: Mobility Impaired (Adult and Pediatric)  Goal: *Acute Goals and Plan of Care (Insert Text)  Physical Therapy Goals   Initiated 12/22/2018 and to be accomplished within 3-5 day(s)  1. Patient will move from supine <> sit with S in prep for out of bed activity and change of position. 2.  Patient will perform sit<> stand with S with LRAD in prep for transfers/ambulation. 3.  Patient will transfer from bed <> chair with S with LRAD for time up in chair for completion of ADL activity. 4.  Patient will ambulate 150 feet with LRAD/S for improved functional mobility/safe discharge. Outcome: Progressing Towards Goal  physical Therapy EVALUATION    Patient: Laura Gandara (89 y.o. female)  Date: 12/22/2018  Primary Diagnosis: Left thalamic infarction [facility])       Precautions:  Fall    ASSESSMENT :  Based on the objective data described below, the patient presents with decrease independence w/ bed mobility, transfers, gait, and step negotiation. Pt seen sitting at the EOB prior to session w/ telemonitor donned. Pt reported no pain at this time however reports R sided numbness of the R hand and feet. Pt reports the R UE/LE feeling heavy and sharp shooting pain down the R LE intermittently. Pt demonstrates good coordination. Pt reports PTA that she was I w/ mobility and no hx of falls. Upon gait training assessment pt demonstrates inability to properly clear the R foot secondary to reporting feeling heaving so a RW was given. Pt demonstrates better stability w/ RW/GB and able to ambulate 60 ft but demonstrates a step to, antalgic gait. Pt transferred back to room where pt was left sitting at the EOB, call bell and tray in reach, nurse notified after session. Patient will benefit from skilled intervention to address the above impairments.   Patients rehabilitation potential is considered to be Good  Factors which may influence rehabilitation potential include:   []         None noted  []         Mental ability/status  [x]         Medical condition  [x]         Home/family situation and support systems  [x]         Safety awareness  []         Pain tolerance/management  []         Other:      PLAN :  Recommendations and Planned Interventions:  [x]           Bed Mobility Training             [x]    Neuromuscular Re-Education  [x]           Transfer Training                   []    Orthotic/Prosthetic Training  [x]           Gait Training                          []    Modalities  [x]           Therapeutic Exercises          []    Edema Management/Control  [x]           Therapeutic Activities            [x]    Patient and Family Training/Education  []           Other (comment):    Frequency/Duration: Patient will be followed by physical therapy once/twice daily to address goals. Discharge Recommendations: Home Health  Further Equipment Recommendations for Discharge: rolling walker     SUBJECTIVE:   Patient stated I feel fine just this R side feels so weird    OBJECTIVE DATA SUMMARY:     Past Medical History:   Diagnosis Date    Diabetes ([facility] [address]     Hypercholesteremia     Hypertension      Past Surgical History:   Procedure Laterality Date    HX GYN      c section     Barriers to Learning/Limitations: yes;  physical  Compensate with: Verbal Cues and Tactile Cues  Prior Level of Function/Home Situation:   Home Situation  Home Environment: Apartment  # Steps to Enter: 0  One/Two Story Residence: One story  Living Alone: No  Support Systems: Family member(s)  Patient Expects to be Discharged to[de-identified] Apartment  Current DME Used/Available at Home: Blood pressure cuff  Critical Behavior:  Neurologic State: Alert  Orientation Level: Oriented X4  Cognition: Appropriate decision making  Psychosocial  Purposeful Interaction: Yes  Pt Identified Daily Priority: Clinical issues (comment)  [place] Process: Nurture loving kindness;Nurture spiritual self;Establish trust;Teaching/learning; Attend basic human needs  Caring Interventions: Reassure; Therapeutic modalities  Reassure: Therapeutic listening; Informing;Caring rounds  Therapeutic Modalities: Humor; Intentional therapeutic touch  Skin Integumentary  Skin Color: Appropriate for ethnicity  Strength:    Strength: Generally decreased, functional  Tone & Sensation:   Tone: Normal  Sensation: Impaired( R sided numbness)  Range Of Motion:  AROM: Generally decreased, functional  Functional Mobility:  Transfers:  Sit to Stand: Stand-by assistance  Stand to Sit: Stand-by assistance  Balance:   Sitting: Intact  Standing: Intact; With support  Ambulation/Gait Training:  Distance (ft): 60 Feet (ft)  Assistive Device: Gait belt;Walker, rolling  Ambulation - Level of Assistance: Contact guard assistance  Gait Description (WDL): Exceptions to WDL  Gait Abnormalities: Antalgic;Decreased step clearance;Shuffling gait; Step to gait  Base of Support: Narrowed; Shift to left  Stance: Right decreased  Speed/Fatmata: Shuffled; Slow  Step Length: Left shortened;Right shortened  Swing Pattern: Left asymmetrical;Right asymmetrical  Pain:  Pain Scale 1: Numeric (0 - 10)  Pain Intensity 1: 0  Pain Location 1: Leg  Pain Orientation 1: Right  Pain Description 1: Numb  Activity Tolerance:   Fair  Please refer to the flowsheet for vital signs taken during this treatment. After treatment:   []         Patient left in no apparent distress sitting up in chair  [x]         Patient left in no apparent distress in bed  [x]         Call bell left within reach  [x]         Nursing notified  [x]         Caregiver present (spouse)   []         Bed alarm activated    COMMUNICATION/EDUCATION:   [x]         Fall prevention education was provided and the patient/caregiver indicated understanding. [x]         Patient/family have participated as able in goal setting and plan of care. [x]         Patient/family agree to work toward stated goals and plan of care.   []         Patient understands intent and goals of therapy, but is neutral about his/her participation. []         Patient is unable to participate in goal setting and plan of care. Thank you for this referral.  Helena Kumar, PT   Time Calculation: 11 mins   Mobility:  Current  CI= 1-19%   Goal  CI= 1-19%. The severity rating is based on the Other Based on functional assessment

## 2018-12-22 NOTE — PROGRESS NOTES
Stroke treatment brochure was provided to: patient. Rationale for acute work-up of symptoms explained. Possible treatments, such as tPA or intervention for ischemic strokes and the need for a quick work-up, have been reviewed.

## 2018-12-22 NOTE — PROGRESS NOTES
Problem: Falls - Risk of  Goal: *Absence of Falls  Document Deepa Fall Risk and appropriate interventions in the flowsheet.   Outcome: Progressing Towards Goal  Fall Risk Interventions:            Medication Interventions: Evaluate medications/consider consulting pharmacy, Teach patient to arise slowly, Patient to call before getting OOB

## 2018-12-23 ENCOUNTER — APPOINTMENT (OUTPATIENT)
Dept: NON INVASIVE DIAGNOSTICS | Age: 43
DRG: 066 | End: 2018-12-23
Attending: INTERNAL MEDICINE
Payer: SELF-PAY

## 2018-12-23 LAB
ECHO AO ARCH DIAM: 3.27 CM
ECHO AO ASC DIAM: 2.9 CM
ECHO AO ROOT DIAM: 2.68 CM
ECHO AV AREA PEAK VELOCITY: 2.5 CM2
ECHO AV AREA VTI: 2.2 CM2
ECHO AV AREA/BSA PEAK VELOCITY: 1.5 CM2/M2
ECHO AV AREA/BSA VTI: 1.3 CM2/M2
ECHO AV MEAN GRADIENT: 3.3 MMHG
ECHO AV PEAK GRADIENT: 5.5 MMHG
ECHO AV PEAK VELOCITY: 117.17 CM/S
ECHO AV VTI: 23.43 CM
ECHO IVC PROX: 1.8 CM
ECHO LA MAJOR AXIS: 3.25 CM
ECHO LA VOL 2C: 36.67 ML (ref 22–52)
ECHO LA VOL 4C: 58.61 ML (ref 22–52)
ECHO LA VOL BP: 49.4 ML (ref 22–52)
ECHO LA VOL/BSA BIPLANE: 29.89 ML/M2 (ref 16–28)
ECHO LA VOLUME INDEX A2C: 22.19 ML/M2 (ref 16–28)
ECHO LA VOLUME INDEX A4C: 35.47 ML/M2 (ref 16–28)
ECHO LV E' LATERAL VELOCITY: 0.07 CM/S
ECHO LV E' SEPTAL VELOCITY: 0.07 CM/S
ECHO LV EDV A2C: 54.2 ML
ECHO LV EDV A4C: 101.6 ML
ECHO LV EDV BP: 77.9 ML (ref 56–104)
ECHO LV EDV INDEX A4C: 61.5 ML/M2
ECHO LV EDV INDEX BP: 47.1 ML/M2
ECHO LV EDV NDEX A2C: 32.8 ML/M2
ECHO LV EJECTION FRACTION A2C: 66 %
ECHO LV EJECTION FRACTION A4C: 65 %
ECHO LV EJECTION FRACTION BIPLANE: 64.3 % (ref 55–100)
ECHO LV ESV A2C: 18.6 ML
ECHO LV ESV A4C: 35.7 ML
ECHO LV ESV BP: 27.8 ML (ref 19–49)
ECHO LV ESV INDEX A2C: 11.3 ML/M2
ECHO LV ESV INDEX A4C: 21.6 ML/M2
ECHO LV ESV INDEX BP: 16.8 ML/M2
ECHO LV INTERNAL DIMENSION DIASTOLIC: 3.97 CM (ref 3.9–5.3)
ECHO LV INTERNAL DIMENSION SYSTOLIC: 2.71 CM
ECHO LV IVSD: 1.2 CM (ref 0.6–0.9)
ECHO LV MASS 2D: 187.9 G (ref 67–162)
ECHO LV MASS INDEX 2D: 113.7 G/M2 (ref 43–95)
ECHO LV POSTERIOR WALL DIASTOLIC: 1.19 CM (ref 0.6–0.9)
ECHO LVOT DIAM: 1.88 CM
ECHO LVOT PEAK GRADIENT: 4.4 MMHG
ECHO LVOT PEAK VELOCITY: 104.95 CM/S
ECHO LVOT VTI: 18.64 CM
ECHO MV A VELOCITY: 83.14 CM/S
ECHO MV AREA PHT: 3.3 CM2
ECHO MV E DECELERATION TIME (DT): 230.2 MS
ECHO MV E VELOCITY: 0.8 CM/S
ECHO MV E/A RATIO: 0.01
ECHO MV E/E' LATERAL: 11.43
ECHO MV E/E' RATIO (AVERAGED): 11.43
ECHO MV E/E' SEPTAL: 11.43
ECHO MV PRESSURE HALF TIME (PHT): 66.8 MS
ECHO RA AREA 4C: 12.62 CM2
ECHO RV INTERNAL DIMENSION: 3.36 CM
ECHO RV TAPSE: 2.4 CM (ref 1.5–2)
GLUCOSE BLD STRIP.AUTO-MCNC: 146 MG/DL (ref 70–110)
GLUCOSE BLD STRIP.AUTO-MCNC: 212 MG/DL (ref 70–110)
GLUCOSE BLD STRIP.AUTO-MCNC: 232 MG/DL (ref 70–110)
GLUCOSE BLD STRIP.AUTO-MCNC: 281 MG/DL (ref 70–110)
GLUCOSE BLD STRIP.AUTO-MCNC: 355 MG/DL (ref 70–110)
GLUCOSE BLD STRIP.AUTO-MCNC: 417 MG/DL (ref 70–110)

## 2018-12-23 PROCEDURE — 74011250637 HC RX REV CODE- 250/637: Performed by: PSYCHIATRY & NEUROLOGY

## 2018-12-23 PROCEDURE — 97116 GAIT TRAINING THERAPY: CPT

## 2018-12-23 PROCEDURE — 65660000000 HC RM CCU STEPDOWN

## 2018-12-23 PROCEDURE — 74011000250 HC RX REV CODE- 250

## 2018-12-23 PROCEDURE — 82962 GLUCOSE BLOOD TEST: CPT

## 2018-12-23 PROCEDURE — 92610 EVALUATE SWALLOWING FUNCTION: CPT

## 2018-12-23 PROCEDURE — 93306 TTE W/DOPPLER COMPLETE: CPT

## 2018-12-23 PROCEDURE — 74011250637 HC RX REV CODE- 250/637: Performed by: INTERNAL MEDICINE

## 2018-12-23 PROCEDURE — 74011636637 HC RX REV CODE- 636/637: Performed by: INTERNAL MEDICINE

## 2018-12-23 RX ORDER — METFORMIN HYDROCHLORIDE 500 MG/1
500 TABLET ORAL 2 TIMES DAILY WITH MEALS
Status: DISCONTINUED | OUTPATIENT
Start: 2018-12-23 | End: 2018-12-24 | Stop reason: HOSPADM

## 2018-12-23 RX ORDER — SODIUM CHLORIDE 9 MG/ML
INJECTION INTRAMUSCULAR; INTRAVENOUS; SUBCUTANEOUS
Status: COMPLETED
Start: 2018-12-23 | End: 2018-12-23

## 2018-12-23 RX ORDER — AMLODIPINE BESYLATE 5 MG/1
10 TABLET ORAL DAILY
Status: DISCONTINUED | OUTPATIENT
Start: 2018-12-23 | End: 2018-12-24 | Stop reason: HOSPADM

## 2018-12-23 RX ORDER — GLIPIZIDE 5 MG/1
10 TABLET ORAL
Status: DISCONTINUED | OUTPATIENT
Start: 2018-12-23 | End: 2018-12-24 | Stop reason: HOSPADM

## 2018-12-23 RX ADMIN — LISINOPRIL 20 MG: 20 TABLET ORAL at 08:19

## 2018-12-23 RX ADMIN — AMLODIPINE BESYLATE 10 MG: 5 TABLET ORAL at 12:35

## 2018-12-23 RX ADMIN — ASPIRIN 81 MG: 81 TABLET, COATED ORAL at 08:19

## 2018-12-23 RX ADMIN — Medication 10 ML: at 06:39

## 2018-12-23 RX ADMIN — Medication 10 ML: at 15:09

## 2018-12-23 RX ADMIN — Medication 10 ML: at 21:40

## 2018-12-23 RX ADMIN — INSULIN LISPRO 10 UNITS: 100 INJECTION, SOLUTION INTRAVENOUS; SUBCUTANEOUS at 15:09

## 2018-12-23 RX ADMIN — ATORVASTATIN CALCIUM 40 MG: 20 TABLET, FILM COATED ORAL at 08:20

## 2018-12-23 RX ADMIN — INSULIN LISPRO 4 UNITS: 100 INJECTION, SOLUTION INTRAVENOUS; SUBCUTANEOUS at 21:39

## 2018-12-23 RX ADMIN — SODIUM CHLORIDE: 9 INJECTION, SOLUTION INTRAMUSCULAR; INTRAVENOUS; SUBCUTANEOUS at 14:03

## 2018-12-23 RX ADMIN — INSULIN LISPRO 4 UNITS: 100 INJECTION, SOLUTION INTRAVENOUS; SUBCUTANEOUS at 06:39

## 2018-12-23 RX ADMIN — GLIPIZIDE 10 MG: 5 TABLET ORAL at 17:02

## 2018-12-23 NOTE — PROGRESS NOTES
Bedside shift change report given to Joseluis Lucas 6896 (oncoming nurse) by Ophelia Archer RN (offgoing nurse). Report included the following information SBAR, Kardex, ED Summary, Intake/Output, MAR, Accordion, Recent Results and Alarm Parameters . 1218 BS was taken after eating lunch. Will recheck in 2 hours. Dr. Ivania Celestins aware. 1455 BS rechecked. Recheck is 355. Pt informed me that she had an apple about thirty minutes earlier. MD Paged. 1500 Dr. Ortiz Corey. Given orders to cover BS and recheck in two hours then cover. Shift Summary: Shift uneventful. No complaints of chest pain or shortness of breath. Call light is within reach.

## 2018-12-23 NOTE — PROGRESS NOTES
NEUROLOGY PROGRESS NOTE        Patient: Sanjuana Garza        Sex: female          DOA: 12/21/2018  YOB: 1975      Age:  39 y.o.         LOS: 2 days     Identification:  39 y.o. female with history of DM and HTN, now with left thalamic stroke           SUBJECTIVE:   Right foot>>hand numbness continues. She feels heavy on her right leg. Stroke Work-up:  Brain MRI: 1. Small focus of acute infarct involving the left thalamus. 2.  Probable early chronic small vessel changes with old areas of infarction, given the patient's history of diabetes. However, given the patient's age, white matter changes can also be seen in the setting of a demyelinating process and correlation with the patient's neurologic history is suggested. 3.  Probable punctate focus of old hemorrhage in the right cerebellar hemisphere, possibly related to the sequela of uncontrolled hypertension or a small focus of old hemorrhagic lacunar infarction. CTA of head and neck: report pending. I do not see major vessel occlusion but some stenotic segments. Echocardiogram:   Lipid panel:   Lab Results   Component Value Date/Time    Cholesterol, total 190 12/22/2018 11:55 AM    HDL Cholesterol 57 12/22/2018 11:55 AM    LDL, calculated 113.2 (H) 12/22/2018 11:55 AM    VLDL, calculated 19.8 12/22/2018 11:55 AM    Triglyceride 99 12/22/2018 11:55 AM    CHOL/HDL Ratio 3.3 12/22/2018 11:55 AM     HbA1c:   Lab Results   Component Value Date/Time    Hemoglobin A1c 12.4 (H) 12/21/2018 05:32 PM     REVIEW OF SYSTEMS: Denies chest pain, abdominal pain, nausea or vomiting. No fever or chills. OBJECTIVE:      Visit Vitals  BP (!) 185/123   Pulse (!) 104   Temp 98.5 °F (36.9 °C)   Resp 16   Ht 5' 1\" (1.549 m)   Wt 66.4 kg (146 lb 6.2 oz)   SpO2 100%   Breastfeeding? No   BMI 27.66 kg/m²     Physical Exam:  GEN: Alert, NAD  EYES: conjunctiva normal, lids with out lesions  HEENT: MMM. HEART: RRR +S1 +S2  LUNGS: CTA B/L no rales or rhonchi.   ABDOMEN: Soft, non-tender. EXTREMITIES: No edema cyanosis  SKIN: no rashes or skin breakdown, no nodules  NEURO: Alert, oriented x3. Speech is fluent. Cranials: Face is symmetric. Smiles symmetrically. Decreased facial sensation on the right. EOMI, VFF. Tongue is midline. Motor: Full strength at all sites. No pronator drift. Sensory: Decreased to touch an  Vibration on right hand and leg. There is some length dependent sensory loss to vibration as well. Coordination: Intact with no dysmetria during FNF.      Current Facility-Administered Medications   Medication Dose Route Frequency    sodium chloride (NS) flush 5-10 mL  5-10 mL IntraVENous Q8H    sodium chloride (NS) flush 5-10 mL  5-10 mL IntraVENous PRN    insulin lispro (HUMALOG) injection   SubCUTAneous AC&HS    glucose chewable tablet 16 g  4 Tab Oral PRN    glucagon (GLUCAGEN) injection 1 mg  1 mg IntraMUSCular PRN    dextrose (D50W) injection syrg 12.5-25 g  25-50 mL IntraVENous PRN    labetalol (NORMODYNE;TRANDATE) 20 mg/4 mL (5 mg/mL) injection 10 mg  10 mg IntraVENous Q4H PRN    atorvastatin (LIPITOR) tablet 40 mg  40 mg Oral DAILY    aspirin delayed-release tablet 81 mg  81 mg Oral DAILY    lisinopril (PRINIVIL, ZESTRIL) tablet 20 mg  20 mg Oral DAILY    influenza vaccine 2018-19 (6 mos+)(PF) (FLUARIX QUAD/FLULAVAL QUAD) injection 0.5 mL  0.5 mL IntraMUSCular PRIOR TO DISCHARGE       Laboratory  Recent Results (from the past 24 hour(s))   GLUCOSE, POC    Collection Time: 12/22/18 11:26 AM   Result Value Ref Range    Glucose (POC) 205 (H) 70 - 262 mg/dL   METABOLIC PANEL, BASIC    Collection Time: 12/22/18 11:55 AM   Result Value Ref Range    Sodium 135 (L) 136 - 145 mmol/L    Potassium 3.8 3.5 - 5.5 mmol/L    Chloride 103 100 - 108 mmol/L    CO2 24 21 - 32 mmol/L    Anion gap 8 3.0 - 18 mmol/L    Glucose 205 (H) 74 - 99 mg/dL    BUN 10 7.0 - 18 MG/DL    Creatinine 0.71 0.6 - 1.3 MG/DL    BUN/Creatinine ratio 14      GFR est AA >60 >60 ml/min/1.73m2 GFR est non-AA >60 >60 ml/min/1.73m2    Calcium 8.5 8.5 - 10.1 MG/DL   LIPID PANEL    Collection Time: 12/22/18 11:55 AM   Result Value Ref Range    LIPID PROFILE          Cholesterol, total 190 <200 MG/DL    Triglyceride 99 <150 MG/DL    HDL Cholesterol 57 40 - 60 MG/DL    LDL, calculated 113.2 (H) 0 - 100 MG/DL    VLDL, calculated 19.8 MG/DL    CHOL/HDL Ratio 3.3 0 - 5.0     GLUCOSE, POC    Collection Time: 12/22/18  9:13 PM   Result Value Ref Range    Glucose (POC) 338 (H) 70 - 110 mg/dL   GLUCOSE, POC    Collection Time: 12/23/18  6:25 AM   Result Value Ref Range    Glucose (POC) 232 (H) 70 - 110 mg/dL       Radiology:  Mri Brain Wo Cont    Result Date: 12/22/2018  EXAM: MRI brain without contrast  12/21/2018. CLINICAL INDICATION/HISTORY: Right foot and arm numbness. COMPARISON: None. TECHNIQUE: Multiplanar multisequential images of the brain were obtained without intravenous contrast. _______________ FINDINGS: BRAIN AND POSTERIOR FOSSA: There is a subcentimeter area of acute infarction involving the central aspect of the left thalamus, extending inferiorly along the posterior margin of the thalamus. There is no intracranial hemorrhage, mass effect, or midline shift. Patchy abnormal signal is noted along the left side of the bela suggesting the sequela of previous infarct. There also appears to be a small area of old infarction along the posterolateral aspect of the left thalamus. Patchy areas of T2 prolongation are noted in the periventricular white matter of both cerebral hemispheres, as well as along the anterior aspect of the temporal horn of the right lateral ventricle. A small focus of T2 prolongation is also noted in the right putamen with a similar appearing focus in the left anterior putaminal region. These foci may represent chronic small vessel changes or additional areas of old infarction. There is no acute intracranial hemorrhage.   There is no significant mass effect and there is no midline shift. On the gradient echo sequence, there is a punctate focus of presumed old hemorrhage in the right cerebellar hemisphere (image 8, series 7). There are no significant additional areas of abnormal parenchymal signal.  No additional regions of true restricted diffusion are demonstrable. There are no areas of restricted diffusion to suggest acute infarct. EXTRA-AXIAL SPACES AND MENINGES: There are no abnormal extra-axial fluid collections. VASCULAR: The visualized portions of the intracranial carotid and vertebrobasilar systems demonstrate patent appearing flow voids. CALVARIA: Low signal is noted throughout the calvarial marrow, possibly related to underlying anemia or other marrow replacing process. SINUSES: Clear, as visualized. CRANIOCERVICAL JUNCTION: Similar to the calvarium, there is diffusely low signal throughout the marrow of the cervical spine. OTHER: None. _______________     IMPRESSION: 1. Small focus of acute infarct involving the left thalamus. 2.  Probable early chronic small vessel changes with old areas of infarction, given the patient's history of diabetes. However, given the patient's age, white matter changes can also be seen in the setting of a demyelinating process and correlation with the patient's neurologic history is suggested. 3.  Probable punctate focus of old hemorrhage in the right cerebellar hemisphere, possibly related to the sequela of uncontrolled hypertension or a small focus of old hemorrhagic lacunar infarction. ASSESSMENT/IMPRESSION:   Left thalamic infarct, likely secondary to small vessel disease. She has remarkable stroke risk factors with HTN and DM. Her LDL si slightly elevated.     RECOMMENDATIONS:  1. Aspirin 81mg po and atorvastatin 40mg daily for now. 2. Continue telemetry monitoring while in house  3. Neuro checks per stroke protocol  4. Normotensive protocol: Will amlodipine 10mg daily to Lisinopril today  5. CTA of head and neck report pending.   6. Echocardiogram with bubble study pending. 7. PT/OT and dispo planning. If echo and CTA reports are unremarkable, she can be d/c'ed home. I will follow the patient as outpatient. Please do not hesitate to return with any questions.     Signed:  Lionel Ann MD  12/23/2018  9:26 AM

## 2018-12-23 NOTE — PROGRESS NOTES
Bedside shift change report given to Balbir Vela RN (oncoming nurse) by Deanna Duong RN (offgoing nurse). Report included the following information SBAR, Kardex, ED Summary, Intake/Output, MAR, Accordion, Recent Results and Alarm Parameters .

## 2018-12-23 NOTE — PROGRESS NOTES
Bedside shift change report given to Magno Kumar RN (oncoming nurse) by Blaze Rutherford RN (offgoing nurse). Report included the following information SBAR, Kardex, ED Summary, Intake/Output, MAR, Accordion, Recent Results and Alarm Parameters .

## 2018-12-23 NOTE — PROGRESS NOTES
Problem: Falls - Risk of  Goal: *Absence of Falls  Document Deepa Fall Risk and appropriate interventions in the flowsheet.   Outcome: Progressing Towards Goal  Fall Risk Interventions:            Medication Interventions: Assess postural VS orthostatic hypotension, Evaluate medications/consider consulting pharmacy, Patient to call before getting OOB, Teach patient to arise slowly

## 2018-12-23 NOTE — PROGRESS NOTES
(On Call GIDEON) GIDEON paged by nursing staff regarding need for a raya walker with plan discharge later today. GIDEON notified nursing that there are no raya walkers available at this time. GIDEON contacted pt in her room and notified her where she can obtain a RW. Pt indicated an understanding of this. No other transition of care needs have been identified.

## 2018-12-23 NOTE — PROGRESS NOTES
1530- Assumed care of patient. Report received from [facility].     3683- Patient BS checked, 281, patient just received 10 units at 1500 by previous RN. Will recheck at 1700 and treat that number. 1700-  on recheck. 1910-Bedside and Verbal shift change report given to Rm Soto RN(oncoming nurse) by Kan Del Cid RN (offgoing nurse).  Report included the following information SBAR, Kardex, Intake/Output, MAR, Recent Results and Cardiac Rhythm SR.

## 2018-12-23 NOTE — PROGRESS NOTES
Hospitalist Progress Note    Patient: Edilberto Pa MRN: [id number]  CSN: [id number]    YOB: 1975  Age: 39 y.o. Sex: female    DOA: 12/21/2018 LOS:  LOS: 2 days              IMPRESSION and Plan:    Edilberto Pa is a 39 y.o. female with   Patient Active Problem List    Diagnosis Date Noted    Left thalamic infarction ([facility] [address] 12/21/2018     Active Problems:    Left thalamic infarction ([facility] [address] (12/21/2018)    1. Acute Left thalamic infarct  2. HTN  3. Dm2  4. Hypokalemia       Plan:  ECho pending  CTA head and neck report pending  Cont current rx  Agree with starting Norvasc today  BS are still very high with HA1c 12.4 -- she is reluctant to start insulin at home. Will start her on glipizide with metformin. Low carb diet reviewed with pt. Will consult nutrition as well  Encourage ambulation    Needs assistant with medications at dc since her medicaid will 'start beginning of the year\"    Needs walker at dc as well    Patient's condition is fair        Recommend to continue hospitalization. Discussed with patient and     Chief Complaints:   Chief Complaint   Patient presents with    Numbness     SUBJECTIVE:  Pt is seen and examined. stil co r sided weakness and numbness    No CP or SOB  No Fever, chills, Nausea, vomitting. Review of systems:    General: No fevers or chills. Cardiovascular: No chest pain or pressure. No palpitations. Pulmonary: no shortness of breath.    Gastrointestinal: No nausea, vomiting    PE:  Patient Vitals for the past 24 hrs:   BP Temp Pulse Resp SpO2 Height Weight   12/23/18 1311 (!) 185/123     5' 1\" (1.549 m) 66.2 kg (146 lb)   12/23/18 1204 (!) 196/110 98.6 °F (37 °C) 91 16 100 %     12/23/18 0815 (!) 185/123 98.5 °F (36.9 °C) (!) 104 16 100 %     12/23/18 0652 (!) 178/100 98.8 °F (37.1 °C) 86 16 100 %     12/23/18 0300 (!) 181/115 98.7 °F (37.1 °C) 95 18 100 %     12/22/18 2310 (!) 189/100 98.6 °F (37 °C) 89 18      12/22/18 2221 (!) 188/100 98.4 °F (36.9 °C) 95 18 98 %  66.4 kg (146 lb 6.2 oz)   12/22/18 1915 (!) 189/101 98.9 °F (37.2 °C) 90 18 100 %     12/22/18 1848 (!) 190/101 99.1 °F (37.3 °C) 92 17 100 %     12/22/18 1645 (!) 182/101 98.4 °F (36.9 °C) 88 16 98 %         Intake/Output Summary (Last 24 hours) at 12/23/2018 1447  Last data filed at 12/23/2018 1207  Gross per 24 hour   Intake 720 ml   Output 1000 ml   Net -280 ml     Patient Vitals for the past 120 hrs:   Weight   12/21/18 1716 72.6 kg (160 lb)   12/22/18 2221 66.4 kg (146 lb 6.2 oz)   12/23/18 1311 66.2 kg (146 lb)           HEENT: Perrla, EOMI; oral mucosa well prefused; Conjunctiva not injected  Neck: No JVD, Negative carotid bruits, normal pulses; No thyromegaly  Resp: CTA bilaterally; No wheezes or rales  CV: RRR s1s2 No murmur; No rubs; PMI not displaced  Abd: Positive Bowel Sounds, Soft, Nontender  Ext: No clubbing; No cyanosis;  No edema  Neuro: Alert and oriented; Nonfocal  Skin: Warm, Dry, Intact  Pulses: 2+ DP/PT/Rad      Intake and Output:  Current Shift:  12/23 0701 - 12/23 1900  In: 480 [P.O.:480]  Out: -   Last three shifts:  12/21 1901 - 12/23 0700  In: 1960 [P.O.:960; I.V.:1000]  Out: 1000 [Urine:1000]    Lab/Data Reviewed:  Recent Results (from the past 8 hour(s))   GLUCOSE, POC    Collection Time: 12/23/18 12:07 PM   Result Value Ref Range    Glucose (POC) 417 (HH) 70 - 110 mg/dL   ECHO ADULT COMPLETE    Collection Time: 12/23/18  2:02 PM   Result Value Ref Range    LA Volume 49.40 22 - 52 mL    Right Atrial Area 4C 12.62 cm2    Ao Root D 2.68 cm     Medications:  Current Facility-Administered Medications   Medication Dose Route Frequency    amLODIPine (NORVASC) tablet 10 mg  10 mg Oral DAILY    sodium chloride (NS) flush 5-10 mL  5-10 mL IntraVENous Q8H    sodium chloride (NS) flush 5-10 mL  5-10 mL IntraVENous PRN    insulin lispro (HUMALOG) injection   SubCUTAneous AC&HS    glucose chewable tablet 16 g  4 Tab Oral PRN    glucagon (GLUCAGEN) injection 1 mg  1 mg IntraMUSCular PRN    dextrose (D50W) injection syrg 12.5-25 g  25-50 mL IntraVENous PRN    labetalol (NORMODYNE;TRANDATE) 20 mg/4 mL (5 mg/mL) injection 10 mg  10 mg IntraVENous Q4H PRN    atorvastatin (LIPITOR) tablet 40 mg  40 mg Oral DAILY    aspirin delayed-release tablet 81 mg  81 mg Oral DAILY    lisinopril (PRINIVIL, ZESTRIL) tablet 20 mg  20 mg Oral DAILY    influenza vaccine 2018-19 (6 mos+)(PF) (FLUARIX QUAD/FLULAVAL QUAD) injection 0.5 mL  0.5 mL IntraMUSCular PRIOR TO DISCHARGE       Recent Results (from the past 24 hour(s))   GLUCOSE, POC    Collection Time: 12/22/18  9:13 PM   Result Value Ref Range    Glucose (POC) 338 (H) 70 - 110 mg/dL   GLUCOSE, POC    Collection Time: 12/23/18  6:25 AM   Result Value Ref Range    Glucose (POC) 232 (H) 70 - 110 mg/dL   GLUCOSE, POC    Collection Time: 12/23/18 12:07 PM   Result Value Ref Range    Glucose (POC) 417 (HH) 70 - 110 mg/dL   ECHO ADULT COMPLETE    Collection Time: 12/23/18  2:02 PM   Result Value Ref Range    LA Volume 49.40 22 - 52 mL    Right Atrial Area 4C 12.62 cm2    Ao Root D 2.68 cm       Procedures/imaging: see electronic medical records for all procedures/Xrays and details which were not copied into this note but were reviewed prior to creation of Kyree Osman MD   12/23/2018, 11:27 AM

## 2018-12-23 NOTE — PROGRESS NOTES
Problem: Falls - Risk of  Goal: *Absence of Falls  Document Deepa Fall Risk and appropriate interventions in the flowsheet.   Outcome: Progressing Towards Goal  Fall Risk Interventions:            Medication Interventions: Patient to call before getting OOB, Teach patient to arise slowly

## 2018-12-23 NOTE — PROGRESS NOTES
Problem: Mobility Impaired (Adult and Pediatric)  Goal: *Acute Goals and Plan of Care (Insert Text)  Physical Therapy Goals   Initiated 12/22/2018 and to be accomplished within 3-5 day(s)  1. Patient will move from supine <> sit with S in prep for out of bed activity and change of position. 2.  Patient will perform sit<> stand with S with LRAD in prep for transfers/ambulation. 3.  Patient will transfer from bed <> chair with S with LRAD for time up in chair for completion of ADL activity. 4.  Patient will ambulate 150 feet with LRAD/S for improved functional mobility/safe discharge. Outcome: Progressing Towards Goal  physical Therapy TREATMENT    Patient: Ander Manley (13 y.o. female)  Date: 12/23/2018  Diagnosis: Left thalamic infarction [facility]) <principal problem not specified>      Precautions: Fall   Chart, physical therapy assessment, plan of care and goals were reviewed. ASSESSMENT:  Pt seen supine prior to session w/ telemonitor. Pt continues to report R LE numbness and feeling heavy. Pt able to increase gait distance w/ RW/ ft w/o any difficulty. Pt transferred back to room where pt was transferred to recliner chair. Pt able to perform therex activity w/ min Vcing. Pt left sitting in recliner chair after session, call bell and tray in reach, nurse notified after session. Pt may go home safely once medically cleared. Progression toward goals:  [x]      Improving appropriately and progressing toward goals  []      Improving slowly and progressing toward goals  []      Not making progress toward goals and plan of care will be adjusted     PLAN:  Patient continues to benefit from skilled intervention to address the above impairments. Continue treatment per established plan of care. Discharge Recommendations:  Home Health  Further Equipment Recommendations for Discharge:  rolling walker     SUBJECTIVE:   Patient stated I feel okay, my leg still feels heavy.     OBJECTIVE DATA SUMMARY: Critical Behavior:  Neurologic State: Alert  Orientation Level: Oriented X4  Cognition: Appropriate decision making, Appropriate for age attention/concentration, Appropriate safety awareness, Follows commands  Safety/Judgement: Awareness of environment, Good awareness of safety precautions  Functional Mobility Training:  Bed Mobility:  Supine to Sit: Supervision  Transfers:  Sit to Stand: Supervision  Stand to Sit: Supervision  Balance:  Sitting: Intact  Standing: Intact; With support  Ambulation/Gait Training:  Distance (ft): 300 Feet (ft)  Assistive Device: Gait belt;Walker, rolling  Ambulation - Level of Assistance: Contact guard assistance  Gait Abnormalities: Antalgic;Decreased step clearance; Step to gait  Base of Support: Narrowed; Shift to left  Stance: Right decreased  Speed/Fatmata: Shuffled; Slow  Step Length: Left shortened;Right shortened  Swing Pattern: Left asymmetrical;Right asymmetrical  Therapeutic Exercises:       EXERCISE   Sets   Reps   Active Active Assist   Passive Self ROM   Comments   Ankle Pumps    [] [] [] []    Quad Sets/Glut Sets   [] [] [] []    Hamstring Sets   [] [] [] []    Short Arc Quads   [] [] [] []    Heel Slides   [] [] [] []    Straight Leg Raises   [] [] [] []    Hip Abd/Add 2 10 [x] [] [] []    Long Arc Quads 2 10 [x] [] [] []    Seated Marching 2 10 [x] [] [] []    Standing Marching   [] [] [] []    Sit<>Stand 1 10 [x] [] [] []      Pain:  Pain Scale 1: Numeric (0 - 10)  Pain Intensity 1: 0  Activity Tolerance:   Good  Please refer to the flowsheet for vital signs taken during this treatment.   After treatment:   [x] Patient left in no apparent distress sitting up in chair  [] Patient left in no apparent distress in bed  [x] Call bell left within reach  [x] Nursing notified  [] Caregiver present  [] Bed alarm activated      Chloe Campos PT   Time Calculation: 18 mins

## 2018-12-23 NOTE — PROGRESS NOTES
Problem: Dysphagia (Adult)  Goal: *Acute Goals and Plan of Care (Insert Text)  Dysphagia Present:   No    Recommendations:  Diet: Regular/thin  Meds: Per patient preference    Patient will:  1. Participate in training and education related to continued aspiration risk, diet recs and compensatory strategies (goal met). Outcome: Resolved/Met Date Met: 12/23/18  Speech LAnguage Pathology bedside swallow evaluation AND DISCHARGE    Patient: Sanjuana Garza (13 y.o. female)  Date: 12/23/2018  Primary Diagnosis: Left thalamic infarction [facility])       Precautions: Fall  PLOF: Independent  ASSESSMENT :  Clinical beside swallow eval completed per MD orders. Pt A&Ox4. Functional communication. Intelligibility >90%. Cognitive-linguistic function appears intact. OM examination revealed oral motor structures functional for mastication and deglutition. Presented with thin liquid, puree, and solid trials. Exhibited + bolus cohesion, manipulation and A-P transit. Further exhibited + swallow timing/reflex and hyolaryngeal excursion. Pt able to manipulate and clear with 0 clinical s/s aspiration and/or oropharyngeal dysphagia. Pt safe for regular solid, thin liquid diet. 0 formal ST needs for dysphagia indicated at this time. SLP educated pt on role of speech therapist in current setting with re: speech/swallow; verbalized comprehension. SLP available for re-evaluation if indicated by MD. Results d/w RN, [employer]. Thank you for this referral.   Yesenia Bolden SLP     PLAN :  Recommendations and Planned Interventions:  No formal ST needs ID'd for dysphagia. Eval only. Discharge Recommendations: None     SUBJECTIVE:   Patient stated I don't have any trouble swallowing.     OBJECTIVE:     Past Medical History:   Diagnosis Date    Diabetes ([facility] [address]     Hypercholesteremia     Hypertension      Past Surgical History:   Procedure Laterality Date    HX GYN      c section     Prior Level of Function/Home Situation: [address] Situation  Home Environment: Apartment  # Steps to Enter: 0  One/Two Story Residence: One story  Living Alone: No  Support Systems: Family member(s)  Patient Expects to be Discharged to[de-identified] Apartment  Current DME Used/Available at Home: Blood pressure cuff  Diet prior to admission: Regular/thin  Current Diet:  Regular/thin   Cognitive and Communication Status:  Neurologic State: Alert  Orientation Level: Oriented X4  Cognition: Appropriate decision making, Appropriate for age attention/concentration, Appropriate safety awareness, Follows commands  Perception: Appears intact  Perseveration: No perseveration noted  Safety/Judgement: Awareness of environment, Good awareness of safety precautions  Oral Assessment:  Oral Assessment  Labial: No impairment  Dentition: Natural;Intact  Oral Hygiene: Good  Lingual: No impairment  Velum: No impairment  Mandible: No impairment  P.O. Trials:  Patient Position: Chair   Vocal quality prior to P.O.: No impairment  Consistency Presented: Thin liquid; Solid;Puree  How Presented: Self-fed/presented; Successive swallows;Straw     Bolus Acceptance: No impairment  Bolus Formation/Control: No impairment     Propulsion: No impairment  Oral Residue: None  Initiation of Swallow: No impairment  Laryngeal Elevation: Functional  Aspiration Signs/Symptoms: None  Pharyngeal Phase Characteristics: No impairment, issues, or problems              Oral Phase Severity: No impairment  Pharyngeal Phase Severity : No impairment    GCODESwallowing:  Swallow Current Status CH= 0%   Swallow Goal Status CH= 0%   Swallow D/C Status CH= 0%    The severity rating is based on the following outcomes:  RUDY Noms Swallow Level 7    Clinical Judgement      PAIN:  Start of Eval: 0  End of Eval: 0     After evaluation:   []            Patient left in no apparent distress sitting up in chair  [x]            Patient left in no apparent distress in bed  [x]            Call bell left within reach  [x] Nursing notified  []            Family present  []            Caregiver present  []            Bed alarm activated      COMMUNICATION/EDUCATION:   [x]            Aspiration precautions; swallow safety; compensatory techniques. [x]            Patient/family have participated as able in goal setting and plan of care. [x]            Patient/family agree to work toward stated goals and plan of care. []            Patient understands intent and goals of therapy; neutral about participation. []            Patient unable to participate in goal setting/plan of care; educ ongoing with interdisciplinary staff  []         Posted safety precautions in patient's room.     Thank you for this referral.  Vilma Blake, SLP  Time Calculation: 14 mins

## 2018-12-23 NOTE — PROGRESS NOTES
1900: Received report from Allan. White board updated. Pt is alert and oriented x4. Family at bedside. Pt currently does not report any pain or respiratory distress. NIH score is 1. Neuro checks per unit protocol. Pt's questions and concerns addressed at this time. Will continue to monitor. 0000: Reassessment completed, no changes compared to initial assessment, pt does not report any pain or discomfort at this time. Bed in lowest position, and call bell within reach. Will continue to monitor. 0400: Pt sleeping comfortably, will continue to round hourly on pt. 0730: Bedside and Verbal shift change report given to Allan (oncoming nurse) by Sophie Corona RN   (offgoing nurse). Report included the following information SBAR, Kardex, ED Summary and Procedure Summary.

## 2018-12-24 ENCOUNTER — HOME HEALTH ADMISSION (OUTPATIENT)
Dept: HOME HEALTH SERVICES | Facility: HOME HEALTH | Age: 43
End: 2018-12-24
Payer: MEDICAID

## 2018-12-24 VITALS
TEMPERATURE: 98.3 F | WEIGHT: 147.27 LBS | RESPIRATION RATE: 16 BRPM | DIASTOLIC BLOOD PRESSURE: 111 MMHG | HEART RATE: 91 BPM | OXYGEN SATURATION: 100 % | BODY MASS INDEX: 27.8 KG/M2 | SYSTOLIC BLOOD PRESSURE: 179 MMHG | HEIGHT: 61 IN

## 2018-12-24 LAB
ALBUMIN SERPL-MCNC: 3.3 G/DL (ref 3.4–5)
ALBUMIN/GLOB SERPL: 0.9 {RATIO} (ref 0.8–1.7)
ALP SERPL-CCNC: 77 U/L (ref 45–117)
ALT SERPL-CCNC: 16 U/L (ref 13–56)
ANION GAP SERPL CALC-SCNC: 10 MMOL/L (ref 3–18)
AST SERPL-CCNC: 12 U/L (ref 15–37)
BASOPHILS # BLD: 0 K/UL (ref 0–0.1)
BASOPHILS NFR BLD: 0 % (ref 0–2)
BILIRUB SERPL-MCNC: 0.5 MG/DL (ref 0.2–1)
BUN SERPL-MCNC: 11 MG/DL (ref 7–18)
BUN/CREAT SERPL: 16
CALCIUM SERPL-MCNC: 8.7 MG/DL (ref 8.5–10.1)
CHLORIDE SERPL-SCNC: 104 MMOL/L (ref 100–108)
CO2 SERPL-SCNC: 23 MMOL/L (ref 21–32)
CREAT SERPL-MCNC: 0.69 MG/DL (ref 0.6–1.3)
DIFFERENTIAL METHOD BLD: ABNORMAL
EOSINOPHIL # BLD: 0.1 K/UL (ref 0–0.4)
EOSINOPHIL NFR BLD: 1 % (ref 0–5)
ERYTHROCYTE [DISTWIDTH] IN BLOOD BY AUTOMATED COUNT: 14.2 % (ref 11.6–14.5)
GLOBULIN SER CALC-MCNC: 3.8 G/DL (ref 2–4)
GLUCOSE BLD STRIP.AUTO-MCNC: 124 MG/DL (ref 70–110)
GLUCOSE BLD STRIP.AUTO-MCNC: 207 MG/DL (ref 70–110)
GLUCOSE SERPL-MCNC: 118 MG/DL (ref 74–99)
HCT VFR BLD AUTO: 39 % (ref 35–45)
HGB BLD-MCNC: 12 G/DL (ref 12–16)
LYMPHOCYTES # BLD: 2.4 K/UL (ref 0.9–3.6)
LYMPHOCYTES NFR BLD: 47 % (ref 21–52)
MCH RBC QN AUTO: 22.7 PG (ref 24–34)
MCHC RBC AUTO-ENTMCNC: 30.8 G/DL (ref 31–37)
MCV RBC AUTO: 73.7 FL (ref 74–97)
MONOCYTES # BLD: 0.5 K/UL (ref 0.05–1.2)
MONOCYTES NFR BLD: 10 % (ref 3–10)
NEUTS SEG # BLD: 2.1 K/UL (ref 1.8–8)
NEUTS SEG NFR BLD: 42 % (ref 40–73)
PLATELET # BLD AUTO: 292 K/UL (ref 135–420)
PMV BLD AUTO: 11.4 FL (ref 9.2–11.8)
POTASSIUM SERPL-SCNC: 3.8 MMOL/L (ref 3.5–5.5)
PROT SERPL-MCNC: 7.1 G/DL (ref 6.4–8.2)
RBC # BLD AUTO: 5.29 M/UL (ref 4.2–5.3)
SODIUM SERPL-SCNC: 137 MMOL/L (ref 136–145)
WBC # BLD AUTO: 5.1 K/UL (ref 4.6–13.2)

## 2018-12-24 PROCEDURE — 80053 COMPREHEN METABOLIC PANEL: CPT

## 2018-12-24 PROCEDURE — 90471 IMMUNIZATION ADMIN: CPT

## 2018-12-24 PROCEDURE — 97167 OT EVAL HIGH COMPLEX 60 MIN: CPT

## 2018-12-24 PROCEDURE — 74011250636 HC RX REV CODE- 250/636: Performed by: INTERNAL MEDICINE

## 2018-12-24 PROCEDURE — 36415 COLL VENOUS BLD VENIPUNCTURE: CPT

## 2018-12-24 PROCEDURE — 74011250637 HC RX REV CODE- 250/637: Performed by: INTERNAL MEDICINE

## 2018-12-24 PROCEDURE — 82962 GLUCOSE BLOOD TEST: CPT

## 2018-12-24 PROCEDURE — 97116 GAIT TRAINING THERAPY: CPT

## 2018-12-24 PROCEDURE — 74011250637 HC RX REV CODE- 250/637: Performed by: PSYCHIATRY & NEUROLOGY

## 2018-12-24 PROCEDURE — 74011636637 HC RX REV CODE- 636/637: Performed by: INTERNAL MEDICINE

## 2018-12-24 PROCEDURE — 85025 COMPLETE CBC W/AUTO DIFF WBC: CPT

## 2018-12-24 PROCEDURE — 90686 IIV4 VACC NO PRSV 0.5 ML IM: CPT | Performed by: INTERNAL MEDICINE

## 2018-12-24 RX ORDER — LISINOPRIL 20 MG/1
20 TABLET ORAL DAILY
Qty: 10 TAB | Refills: 0 | Status: SHIPPED | OUTPATIENT
Start: 2018-12-25 | End: 2019-01-03

## 2018-12-24 RX ORDER — ATORVASTATIN CALCIUM 40 MG/1
40 TABLET, FILM COATED ORAL DAILY
Qty: 10 TAB | Refills: 0 | Status: SHIPPED | OUTPATIENT
Start: 2018-12-25 | End: 2019-01-03

## 2018-12-24 RX ORDER — GLIPIZIDE 10 MG/1
10 TABLET ORAL
Qty: 10 TAB | Refills: 0 | Status: SHIPPED | OUTPATIENT
Start: 2018-12-24 | End: 2018-12-29

## 2018-12-24 RX ORDER — AMLODIPINE BESYLATE 10 MG/1
10 TABLET ORAL DAILY
Qty: 10 TAB | Refills: 0 | Status: SHIPPED | OUTPATIENT
Start: 2018-12-25 | End: 2019-01-03

## 2018-12-24 RX ORDER — METFORMIN HYDROCHLORIDE 500 MG/1
500 TABLET ORAL 2 TIMES DAILY WITH MEALS
Qty: 20 TAB | Refills: 0 | Status: SHIPPED | OUTPATIENT
Start: 2018-12-24 | End: 2019-01-03

## 2018-12-24 RX ORDER — ASPIRIN 81 MG/1
81 TABLET ORAL DAILY
Qty: 30 TAB | Refills: 3 | Status: SHIPPED | OUTPATIENT
Start: 2018-12-25

## 2018-12-24 RX ADMIN — AMLODIPINE BESYLATE 10 MG: 5 TABLET ORAL at 09:31

## 2018-12-24 RX ADMIN — Medication 10 ML: at 06:31

## 2018-12-24 RX ADMIN — INFLUENZA VIRUS VACCINE 0.5 ML: 15; 15; 15; 15 SUSPENSION INTRAMUSCULAR at 13:19

## 2018-12-24 RX ADMIN — GLIPIZIDE 10 MG: 5 TABLET ORAL at 06:31

## 2018-12-24 RX ADMIN — ASPIRIN 81 MG: 81 TABLET, COATED ORAL at 09:30

## 2018-12-24 RX ADMIN — INSULIN LISPRO 4 UNITS: 100 INJECTION, SOLUTION INTRAVENOUS; SUBCUTANEOUS at 12:06

## 2018-12-24 RX ADMIN — ATORVASTATIN CALCIUM 40 MG: 20 TABLET, FILM COATED ORAL at 09:30

## 2018-12-24 RX ADMIN — LISINOPRIL 20 MG: 20 TABLET ORAL at 09:31

## 2018-12-24 NOTE — PROGRESS NOTES
Problem: Falls - Risk of  Goal: *Absence of Falls  Document Deepa Fall Risk and appropriate interventions in the flowsheet.   Outcome: Progressing Towards Goal  Fall Risk Interventions:  Mobility Interventions: Assess mobility with egress test, Communicate number of staff needed for ambulation/transfer         Medication Interventions: Patient to call before getting OOB, Teach patient to arise slowly

## 2018-12-24 NOTE — PROGRESS NOTES
Problem: Self Care Deficits Care Plan (Adult)  Goal: *Acute Goals and Plan of Care (Insert Text)  Outcome: Resolved/Met Date Met: 12/24/18  Occupational Therapy EVALUATION/discharge    Patient: Nat Cardenas (58 y.o. female)  Date: 12/24/2018  Primary Diagnosis: Left thalamic infarction [facility])       Precautions:  Fall    ASSESSMENT AND RECOMMENDATIONS:  Based on the objective data described below, the patient presents with ability to perform ADL tasks at baseline level. Pt w/ mild R hand/dominant FMC and sensory deficits. Pt able to dress self and open containers. Instructed on safety w/ lack of sensation w/ regards to testing bath water w/ L hand. Instructed on how to make a sensory kit to assist as well as use of smart phone to use R hand for sensory re-ed. Pt with no further OT/ADL concerns at this time in this setting. Education: Reviewed home safety, body mechanics, signs and symptoms of a stroke, risk factors, blood sugar management in relation to stroke, and adaptive dressing techniques with patient verbalizing understanding at this time. Stroke Education: Patient educated on signs/symptoms of stroke and importance of early intervention. Patient verbalized understanding. Discharge Recommendations: Outpatient  Further Equipment Recommendations for Discharge: N/A      SUBJECTIVE:   Patient stated I'll have help.     OBJECTIVE DATA SUMMARY:     Past Medical History:   Diagnosis Date    Diabetes ([address]     Hypercholesteremia     Hypertension      Past Surgical History:   Procedure Laterality Date    HX GYN      c section     Barriers to Learning/Limitations: yes;  physical  Compensate with: visual, verbal, tactile, kinesthetic cues/model    Prior Level of Function/Home Situation: Pt w/ supportive family; son at [facility] [address]: Apartment  # Steps to Enter: 0  One/Two Story Residence: One story  Living Alone: No  Support Systems: Spouse/Significant Other/Partner, Child(dominik), Family member(s)  Patient Expects to be Discharged to[de-identified] Apartment  Current DME Used/Available at Home: Blood pressure cuff  [x]     Right hand dominant   []     Left hand dominant    Cognitive/Behavioral Status:  Neurologic State: Alert  Orientation Level: Appropriate for age;Oriented X4  Cognition: Appropriate decision making; Appropriate for age attention/concentration; Appropriate safety awareness; Follows commands  Safety/Judgement: Awareness of environment;Decreased insight into deficits    Skin: appears grossly intact   Edema: No significant edema noted     Vision/Perceptual:    Tracking: Requires cues, head turns, or add eye shifts to track    Convergence: Breaks at 8 from nose    Diplopia: No    Acuity: Able to read employee name badge without difficulty       Coordination: BUE  Coordination: Within functional limits  Fine Motor Skills-Upper: Left Intact; Right Intact    Gross Motor Skills-Upper: Left Intact; Right Intact    Balance:  Sitting: Intact  Standing: Intact; With support    Strength: BUE  Strength: Generally decreased, functional  Tone & Sensation: BUE  Tone: Normal  Sensation: Intact  Range of Motion: BUE  AROM: Generally decreased, functional  PROM: Generally decreased, functional     Functional Mobility and Transfers for ADLs:  Transfers:  Sit to Stand: Supervision  Bed to Chair: Supervision   Toilet Transfer : Supervision   Bathroom Mobility: Supervision/set up    ADL Assessment:  Feeding: Setup    Oral Facial Hygiene/Grooming: Supervision    Bathing: Setup    Upper Body Dressing: Setup    Lower Body Dressing: Setup    Toileting: Supervision    ADL Intervention:  Grooming  Washing Hands: Supervision/set-up    Lower Body Dressing Assistance  Socks: Supervision/set-up  Position Performed: Seated in chair(ankle-over-knee)    Cognitive Retraining  Problem Solving: Inductive reason; Identifying the task; Identifying the problem;General alternative solution;Deductive reason; Awareness of environment  Executive Functions: Executing cognitive plans  Organizing/Sequencing: Breaking task down;Prioritizing  Following Commands: Awareness of environment; Follows one step commands/directions  Safety/Judgement: Awareness of environment;Decreased insight into deficits  Cues: Tactile cues provided;Verbal cues provided;Visual cues provided    Therapeutic Activities:  Able to open containers w/ RUE    Pain:  Pre-treatment: 0/10  Post-treatment: 0/10    Activity Tolerance:   Patient able to stand 3-4 minute(s). Patient able to complete ADLs with intermittent rest breaks. Patient limited by R sided decreased senstaion. Please refer to the flowsheet for vital signs taken during this treatment. After treatment:   []  Patient left in no apparent distress sitting up in chair  []  Patient left in no apparent distress in bed  [x]  Call bell left within reach  [x]  Nursing notified  [x]  Caregiver present/walking w/ PT/Simona  []  Bed alarm activated    COMMUNICATION/EDUCATION:   Communication/Collaboration:  [x]      Home safety education was provided and the patient/caregiver indicated understanding. [x]      Patient/family have participated as able and agree with findings and recommendations. []      Patient is unable to participate in plan of care at this time. Thank you for this referral.  Luann Beckwith, OTR/L  Time Calculation: 10 mins    G-Codes (GP)  Self Care   Current  CI= 1-19%   Goal  CI= 1-19%   D/C  CI= 1-19%  The severity rating is based on the professional judgement & direct observation of Level of Assistance required for Functional Mobility and ADLs. Eval Complexity: History: HIGH Complexity : Extensive review of history including physical, cognitive and psychosocial history ;    Examination: MEDIUM Complexity : 3-5 performance deficits relating to physical, cognitive , or psychosocial skils that result in activity limitations and / or participation restrictions; Decision Making:MEDIUM Complexity : Patient may present with comorbidities that affect occupational performnce.  Miniml to moderate modification of tasks or assistance (eg, physical or verbal ) with assesment(s) is necessary to enable patient to complete evaluation

## 2018-12-24 NOTE — DISCHARGE SUMMARY
Discharge Summary    Patient: Amanda Johnson MRN: [id number]  CSN: [id number]    YOB: 1975  Age: 39 y.o. Sex: female    DOA: 12/21/2018 LOS:  LOS: 3 days   Discharge Date:      Primary Care Provider:  None    Admission Diagnoses: Left thalamic infarction [facility])    Discharge Diagnoses:    Problem List as of 12/24/2018 Never Reviewed          Codes Class Noted - Resolved    Left thalamic infarction [facility]) ICD-10-CM: I63.9  ICD-9-CM: 434.91  12/21/2018 - Present              Discharge Medications:     Current Discharge Medication List      START taking these medications    Details   metFORMIN (GLUCOPHAGE) 500 mg tablet Take 1 Tab by mouth two (2) times daily (with meals). Qty: 20 Tab, Refills: 0      glipiZIDE (GLUCOTROL) 10 mg tablet Take 1 Tab by mouth Before breakfast and dinner. Qty: 10 Tab, Refills: 0      atorvastatin (LIPITOR) 40 mg tablet Take 1 Tab by mouth daily. Qty: 10 Tab, Refills: 0      lisinopril (PRINIVIL, ZESTRIL) 20 mg tablet Take 1 Tab by mouth daily. Qty: 10 Tab, Refills: 0      amLODIPine (NORVASC) 10 mg tablet Take 1 Tab by mouth daily. Qty: 10 Tab, Refills: 0      aspirin delayed-release 81 mg tablet Take 1 Tab by mouth daily. Qty: 30 Tab, Refills: 3             Discharge Condition: Good    Procedures :      Consults: Neurology     ASSESSMENT/IMPRESSION:   Left thalamic infarct, likely secondary to small vessel disease. She has remarkable stroke risk factors with HTN and DM. Her LDL si slightly elevated.     RECOMMENDATIONS:  1. Aspirin 81mg po and atorvastatin 40mg daily . 2. Blood-pressure parameter less than 140/90, start antihypertensive medication per hospitalist team.  3. Patient can be discharged home from neurologic standpoint.     Follow up neurology as outpatient. Please do not hesitate to return with any questions.     Signed:   Mo Meza MD  12/24/2018  9:26 AM          PHYSICAL EXAM    Visit Vitals  BP (!) 179/111 (BP 1 Location: Right arm, BP Patient Position: At rest;Supine)   Pulse 91   Temp 98.3 °F (36.8 °C)   Resp 16   Ht 5' 1\" (1.549 m)   Wt 66.8 kg (147 lb 4.3 oz)   SpO2 100%   Breastfeeding? No   BMI 27.83 kg/m²     General: Awake, cooperative, no acute distress    HEENT: NC, Atraumatic. PERRLA, EOMI. Anicteric sclerae. Lungs:  CTA Bilaterally. No Wheezing/Rhonchi/Rales. Heart:  Regular  rhythm,  No murmur, No Rubs, No Gallops  Abdomen: Soft, Non distended, Non tender. +Bowel sounds,   Extremities: No c/c/e  Psych:   Not anxious or agitated. Neurologic:  No acute neurological deficits. Admission HPI :   Hospital Course :    1. Acute Left thalamic infarct  2. HTN  3. Dm2  4.  Hypokalemia           Activity: Activity as tolerated    Diet: Cardiac Diet and Diabetic Diet    Follow-up:     Disposition:  Home     Minutes spent on discharge: 50       Labs: Results:       Chemistry Recent Labs     12/24/18  0400 12/22/18  1155 12/21/18  1732   * 205* 324*    135* 133*   K 3.8 3.8 3.0*    103 99*   CO2 23 24 27   BUN 11 10 10   CREA 0.69 0.71 0.81   CA 8.7 8.5 8.5   AGAP 10 8 7   BUCR 16 14 12   AP 77  --  84   TP 7.1  --  7.5   ALB 3.3*  --  3.4   GLOB 3.8  --  4.1*   AGRAT 0.9  --  0.8      CBC w/Diff Recent Labs     12/24/18  0400 12/21/18  1732   WBC 5.1 7.1   RBC 5.29 4.78   HGB 12.0 10.9*   HCT 39.0 35.0    283   GRANS 42 54   LYMPH 47 39   EOS 1 1      Cardiac Enzymes Recent Labs     12/21/18  1732   CPK 95   CKND1 CALCULATION NOT PERFORMED WHEN RESULT IS BELOW LINEAR LIMIT      Coagulation Recent Labs     12/21/18  1732   PTP 12.5   INR 1.0   APTT 29.2       Lipid Panel Lab Results   Component Value Date/Time    Cholesterol, total 190 12/22/2018 11:55 AM    HDL Cholesterol 57 12/22/2018 11:55 AM    LDL, calculated 113.2 (H) 12/22/2018 11:55 AM    VLDL, calculated 19.8 12/22/2018 11:55 AM    Triglyceride 99 12/22/2018 11:55 AM    CHOL/HDL Ratio 3.3 12/22/2018 11:55 AM      BNP No results for input(s): BNPP in the last 72 hours. Liver Enzymes Recent Labs     12/24/18  0400   TP 7.1   ALB 3.3*   AP 77   SGOT 12*      Thyroid Studies No results found for: T4, T3U, TSH, TSHEXT         Significant Diagnostic Studies: Mri Brain Wo Cont    Result Date: 12/22/2018  EXAM: MRI brain without contrast  12/21/2018. CLINICAL INDICATION/HISTORY: Right foot and arm numbness. COMPARISON: None. TECHNIQUE: Multiplanar multisequential images of the brain were obtained without intravenous contrast. _______________ FINDINGS: BRAIN AND POSTERIOR FOSSA: There is a subcentimeter area of acute infarction involving the central aspect of the left thalamus, extending inferiorly along the posterior margin of the thalamus. There is no intracranial hemorrhage, mass effect, or midline shift. Patchy abnormal signal is noted along the left side of the bela suggesting the sequela of previous infarct. There also appears to be a small area of old infarction along the posterolateral aspect of the left thalamus. Patchy areas of T2 prolongation are noted in the periventricular white matter of both cerebral hemispheres, as well as along the anterior aspect of the temporal horn of the right lateral ventricle. A small focus of T2 prolongation is also noted in the right putamen with a similar appearing focus in the left anterior putaminal region. These foci may represent chronic small vessel changes or additional areas of old infarction. There is no acute intracranial hemorrhage. There is no significant mass effect and there is no midline shift. On the gradient echo sequence, there is a punctate focus of presumed old hemorrhage in the right cerebellar hemisphere (image 8, series 7). There are no significant additional areas of abnormal parenchymal signal.  No additional regions of true restricted diffusion are demonstrable. There are no areas of restricted diffusion to suggest acute infarct.  EXTRA-AXIAL SPACES AND MENINGES: There are no abnormal extra-axial fluid collections. VASCULAR: The visualized portions of the intracranial carotid and vertebrobasilar systems demonstrate patent appearing flow voids. CALVARIA: Low signal is noted throughout the calvarial marrow, possibly related to underlying anemia or other marrow replacing process. SINUSES: Clear, as visualized. CRANIOCERVICAL JUNCTION: Similar to the calvarium, there is diffusely low signal throughout the marrow of the cervical spine. OTHER: None. _______________     IMPRESSION: 1. Small focus of acute infarct involving the left thalamus. 2.  Probable early chronic small vessel changes with old areas of infarction, given the patient's history of diabetes. However, given the patient's age, white matter changes can also be seen in the setting of a demyelinating process and correlation with the patient's neurologic history is suggested. 3.  Probable punctate focus of old hemorrhage in the right cerebellar hemisphere, possibly related to the sequela of uncontrolled hypertension or a small focus of old hemorrhagic lacunar infarction. Xr Chest Port    Result Date: 12/22/2018  --------------------------------------------------------------------------- <<<<<<<<<           [facility]           >>>>>>>>> --------------------------------------------------------------------------- CLINICAL HISTORY:  Hypertension. Pain. COMPARISON EXAMINATIONS:  None. ---  SINGLE FRONTAL VIEW OF THE CHEST  --- The lungs and pleural spaces are clear. The mediastinum is unremarkable in appearance. No significant osseous abnormalities are identified.  --------------    Impression: -------------- No active pulmonary disease. Cta Head Neck W Cont    Result Date: 12/23/2018  CTA head and CTA neck with contrast 12/22/2018. CLINICAL INDICATION/HISTORY:   Carotid stenosis. COMPARISON:   None.  TECHNIQUE: Multiple axial CT images of the neck were obtained extending from the level of the aortic arch to the skull base after administration IV contrast utilizing a CTA protocol. Multiple axial CT images of the head were obtained extending from below the level of the skull base to the vertex after the administration of the IV contrast utilizing a CTA protocol. Multiplanar reconstructions were obtained, including MIP reconstructions as well as curved planar reformats. Multiple additional 3-D surface rendering reformations were performed at a separate workstation. Dose reduction techniques:  Automated exposure control, mAs and/or kVp reductions based on patient size, and iterative reconstruction. The specific techniques utilized on this CT exam have been documented in the patient's electronic medical record. FINDINGS: GREAT VESSEL ORIGINS -   Patent. CERVICAL VASCULATURE -   WNL. INTRACRANIAL VASCULATURE -   WNL OTHER -   NONE. IMPRESSION: 1. Unremarkable CTA of the head and neck. No results found for this or any previous visit.         CC: None

## 2018-12-24 NOTE — CDMP QUERY
Please note this question regarding this patient's diabetes status. The medical record reflects the following:    Risk:DM 2    Clinical Indicators:PN states \"BS are still very high with HA1c 12.4 -- she is reluctant to start insulin at home. Will start her on glipizide with metformin. Low carb diet reviewed with pt. Will consult nutrition as well\"    12/23-Glucose=281, 212  12/24-NN stated blood sugar=355    Treatment: SSI    Please clarify if this patient is being treated/managed for:    =>Diabetes 2 with Hyperglycemia  =>Other Explanation of clinical findings  =>Unable to Determine (no explanation of clinical findings)    Please clarify and document your clinical opinion in the progress notes and discharge summary including the definitive and/or presumptive diagnosis, (suspected or probable), related to the above clinical findings. Please include clinical findings supporting your diagnosis. If you DECLINE this query or would like to communicate with [facility], please utilize the \"[facility] message box\" at the TOP of the Progress Note on the right.       Thank you,  John Poole RN [facility]  [phone]

## 2018-12-24 NOTE — DIABETES MGMT
Diabetes Patient/Family Education Record  Factors That  May Influence Patients Ability  to Learn or  Comply with Recommendations   []   Language barrier    []   Cultural needs   []   Motivation    []   Cognitive limitation    []   Physical   []   Education    []   Physiological factors   []   Hearing/vision/speaking impairment   []   Scientologist beliefs    [x]   Financial factors   []  Other:   []  No factors identified at this time. Person Instructed:   [x]   Patient   [x]   Family:  present but not fully engaged in education. []  Other     Preference for Learning:   [x]   Verbal   []   Written   []  Demonstration     Level of Comprehension & Competence:    [x]  Good                                      [] Fair                                     []  Poor                             [x]  Needs Reinforcement   []  Teachback completed    Education Component: Provided pt w/ glucometer kit, 100 lancets, and 50 test strips. Provide [employer] product brochure. Pt already knows how to use a glucometer. []  Medication management, including how to administer insulin (if appropriate) and potential medication interactions    [x]  Nutritional management -obtain usual meal pattern: The healthy plate model, portion control, unsweetened beverages/alternative beverages, heart healthy fats, and provided my carbohydrate guide booklet. Provided Pt w/ OP [facility] diabetes self mgt class info.     []  Exercise   [x]  Signs, symptoms, and treatment of hyperglycemia and hypoglycemia   [] Prevention, recognition and treatment of hyperglycemia and hypoglycemia   [x]  Importance of blood glucose monitoring and how to obtain a blood glucose meter    []  Instruction on use of the blood glucose meter   [x]  Discuss the importance of HbA1C monitoring    []  Sick day guidelines   []  Proper use and disposal of lancets, needles, syringes or insulin pens (if appropriate)   [x]  Potential long-term complications (retinopathy, kidney disease, neuropathy, foot care, infection)   [] Information about whom to contact in case of emergency or for more information    [x]  Goal:  Patient/family will demonstrate understanding of Diabetes Self Management Skills by: 1/3/18   Plan for post-discharge education or self-management support:    [x] Outpatient class schedule provided            [] Patient Declined    [] Scheduled for outpatient classes (date) _______  Verify:  Does patient understand how diabetes medications work? N/a  Does patient know what their most recent A1c is? Yes  Does patient monitor glucose at home? No  Does patient have a glucometer, testing supplies or difficulty obtaining diabetes medications? No, will provide w/ glucometer.       Cedric Argueta  [facility] [phone]

## 2018-12-24 NOTE — PROGRESS NOTES
Problem: Mobility Impaired (Adult and Pediatric)  Goal: *Acute Goals and Plan of Care (Insert Text)  Physical Therapy Goals   Initiated 12/22/2018 and to be accomplished within 3-5 day(s)  1. Patient will move from supine <> sit with S in prep for out of bed activity and change of position. 2.  Patient will perform sit<> stand with S with LRAD in prep for transfers/ambulation. 3.  Patient will transfer from bed <> chair with S with LRAD for time up in chair for completion of ADL activity. 4.  Patient will ambulate 150 feet with LRAD/S for improved functional mobility/safe discharge. Outcome: Progressing Towards Goal  physical Therapy TREATMENT/DISCHARGE    Patient: Surjit Rivas (89 y.o. female)  Date: 12/24/2018  Diagnosis: Left thalamic infarction [facility]) <principal problem not specified>  Precautions: Fall  Chart, physical therapy assessment, plan of care and goals were reviewed. ASSESSMENT:  Pt progressing with mobility. Pt able to compensate for R LE weakness/numbness with safe ambulation. Pt amb 300 ft c/RW, supervision. Pt has no steps to enter. Reviewed signs and symptoms of stroke. Reviewed exercises and strategies for home safety. Pt has met inpatient physical therapy goals. Recommend home health upon d/c for continued PT intervention. Progression toward goals:  [x]      Goals met  []      Improving appropriately and progressing toward goals  []      Improving slowly and progressing toward goals  []      Not making progress toward goals and plan of care will be adjusted     PLAN:  Patient will be discharged from physical therapy at this time. Rationale for discharge:  [x] Goals Achieved  [] [address]  [] Patient not participating in therapy  [] Other:  Discharge Recommendations:  Home Health  Further Equipment Recommendations for Discharge:  RW     SUBJECTIVE:   Patient stated I am doing ok.     OBJECTIVE DATA SUMMARY:   Critical Behavior:  Neurologic State: Alert  Orientation Level: Appropriate for age, Oriented X4  Cognition: Appropriate decision making, Appropriate for age attention/concentration, Appropriate safety awareness, Follows commands  Safety/Judgement: Awareness of environment, Good awareness of safety precautions  Functional Mobility Training:  Transfers:  Sit to Stand: Supervision  Stand to Sit: Supervision  Balance:  Sitting: Intact  Standing: Intact; With support  Ambulation/Gait Training:  Distance (ft): 300 Feet (ft)  Assistive Device: Gait belt;Walker, rolling  Ambulation - Level of Assistance: Supervision  Gait Abnormalities: Decreased step clearance  Speed/Fatmata: Slow  Step Length: Right shortened;Left shortened  Pain:  Pain Scale 1: Numeric (0 - 10)  Pain Intensity 1: 0  Activity Tolerance:   Good  Please refer to the flowsheet for vital signs taken during this treatment.   After treatment:   [] Patient left in no apparent distress sitting up in chair  [x] Patient left in no apparent distress in bed  [x] Call bell left within reach  [x] Nursing notified  [] Caregiver present  [] Bed alarm activated  Candance Fender Titcomb   Time Calculation: 13 mins

## 2018-12-24 NOTE — PROGRESS NOTES
Discharge instructions explained and understood by the patient. IV discontinued, no redness, swelling or pain noted. Son to bedside for transportation. Patient armband removed and shredded.

## 2018-12-24 NOTE — PROGRESS NOTES
NUTRITION UPDATE    - Will defer to glycemic control team     Would recc switching diet to [place] 1800 to meet estimated needs        Emmet Dakin, RD  PAGER:  [phone]

## 2018-12-24 NOTE — PROGRESS NOTES
Reason for Admission:   Chart reviewed and noted Pt has been admitted for left thalamic infarct. CM met with pt and spouse at bedside. Per patient her plan is to return home after dc. She verbalizes not having a PCP, and CM will provide her information on [facility] clinic so she can call on Wed to make a follow up appt. FOC offered and pt is agreeable to [facility] services due to no insurance [facility] [phone] will provide raya care- at least one nurse visit and Therapy eval. CMS referral placed    Pt is being dc home today with spouse. Pt needs assistance with medications. Cm will use [facility] for lipitor, norvasc, glipizide, glucophage, and Lisinopril. Cm has spoken with Mr. Durham- Spouse and he is aware of how to obtain medications form [facility] in [place]. RRAT Score:       6              Plan for utilizing home health:      Yes, [facility]                    Likelihood of Readmission:  Low                         Transition of Care Plan:      Home with family and [facility].       Care Management Interventions  PCP Verified by CM: Yes([facility] follow up)  Transition of Care Consult (CM Consult): Discharge Planning  Physical Therapy Consult: Yes  Occupational Therapy Consult: Yes  Speech Therapy Consult: Yes  Current Support Network: Lives with Spouse  Confirm Follow Up Transport: Family  Plan discussed with Pt/Family/Caregiver: Yes  Freedom of Choice Offered: Yes  Discharge Location  Discharge Placement: Home with home health

## 2018-12-24 NOTE — PROGRESS NOTES
1900: Received report from [id number] [facility]. White board updated. Pt is alert and oriented x4. Family at bedside. Pt currently does not report any pain or respiratory distress. NIH score is a 1. Pt's questions and concerns addressed at this time. Will continue to monitor. 0000: Reassessment completed, no changes compared to initial assessment, pt does not report any pain or discomfort at this time. Bed in lowest position, and call bell within reach. Will continue to monitor. 0400: Pt sleeping comfortable and in NAD, family at bedside, will continue to round hourly on pt. 0730: Bedside and Verbal shift change report given to [id number] [place] [address] (oncoming nurse) by Michael Esquivel RN   (offgoing nurse). Report included the following information SBAR, Kardex, Procedure Summary, Intake/Output, MAR and Recent Results.

## 2018-12-24 NOTE — PROGRESS NOTES
NEUROLOGY PROGRESS NOTE        Patient: Surjit Rivas        Sex: female          DOA: 12/21/2018  YOB: 1975      Age:  39 y.o.         LOS: 3 days     Identification:  39 y.o. female with history of DM and HTN, now with left thalamic stroke           SUBJECTIVE:   Right foot>>hand numbness continues. She feels heavy on her right leg. Stroke Work-up:  Brain MRI: 1. Small focus of acute infarct involving the left thalamus. 2.  Probable early chronic small vessel changes with old areas of infarction, given the patient's history of diabetes. However, given the patient's age, white matter changes can also be seen in the setting of a demyelinating process and correlation with the patient's neurologic history is suggested. 3.  Probable punctate focus of old hemorrhage in the right cerebellar hemisphere, possibly related to the sequela of uncontrolled hypertension or a small focus of old hemorrhagic lacunar infarction. CTA of head and neck: unremarkable  Echocardiogram: unremarkable  Lipid panel:   Lab Results   Component Value Date/Time    Cholesterol, total 190 12/22/2018 11:55 AM    HDL Cholesterol 57 12/22/2018 11:55 AM    LDL, calculated 113.2 (H) 12/22/2018 11:55 AM    VLDL, calculated 19.8 12/22/2018 11:55 AM    Triglyceride 99 12/22/2018 11:55 AM    CHOL/HDL Ratio 3.3 12/22/2018 11:55 AM     HbA1c:   Lab Results   Component Value Date/Time    Hemoglobin A1c 12.4 (H) 12/21/2018 05:32 PM     REVIEW OF SYSTEMS: Denies chest pain, abdominal pain, nausea or vomiting. No fever or chills. OBJECTIVE:      Visit Vitals  BP (!) 179/111 (BP 1 Location: Right arm, BP Patient Position: At rest;Supine)   Pulse 91   Temp 98.3 °F (36.8 °C)   Resp 16   Ht 5' 1\" (1.549 m)   Wt 66.8 kg (147 lb 4.3 oz)   SpO2 100%   Breastfeeding? No   BMI 27.83 kg/m²     Physical Exam:  GEN: Alert, NAD  EYES: conjunctiva normal, lids with out lesions  HEENT: MMM.   HEART: RRR +S1 +S2  LUNGS: CTA B/L no rales or rhonchi. ABDOMEN: Soft, non-tender. EXTREMITIES: No edema cyanosis  SKIN: no rashes or skin breakdown, no nodules  NEURO: Alert, oriented x3. Speech is fluent. Cranials: Face is symmetric. Smiles symmetrically. Decreased facial sensation on the right. EOMI, VFF. Tongue is midline. Motor: Full strength at all sites. No pronator drift. Sensory: Decreased to touch an  Vibration on right hand and leg. There is some length dependent sensory loss to vibration as well. Coordination: Intact with no dysmetria during FNF.      Current Facility-Administered Medications   Medication Dose Route Frequency    amLODIPine (NORVASC) tablet 10 mg  10 mg Oral DAILY    glipiZIDE (GLUCOTROL) tablet 10 mg  10 mg Oral ACB&D    metFORMIN (GLUCOPHAGE) tablet 500 mg  500 mg Oral BID WITH MEALS    sodium chloride (NS) flush 5-10 mL  5-10 mL IntraVENous Q8H    sodium chloride (NS) flush 5-10 mL  5-10 mL IntraVENous PRN    insulin lispro (HUMALOG) injection   SubCUTAneous AC&HS    glucose chewable tablet 16 g  4 Tab Oral PRN    glucagon (GLUCAGEN) injection 1 mg  1 mg IntraMUSCular PRN    dextrose (D50W) injection syrg 12.5-25 g  25-50 mL IntraVENous PRN    labetalol (NORMODYNE;TRANDATE) 20 mg/4 mL (5 mg/mL) injection 10 mg  10 mg IntraVENous Q4H PRN    atorvastatin (LIPITOR) tablet 40 mg  40 mg Oral DAILY    aspirin delayed-release tablet 81 mg  81 mg Oral DAILY    lisinopril (PRINIVIL, ZESTRIL) tablet 20 mg  20 mg Oral DAILY    influenza vaccine 2018-19 (6 mos+)(PF) (FLUARIX QUAD/FLULAVAL QUAD) injection 0.5 mL  0.5 mL IntraMUSCular PRIOR TO DISCHARGE       Laboratory  Recent Results (from the past 24 hour(s))   GLUCOSE, POC    Collection Time: 12/23/18 12:07 PM   Result Value Ref Range    Glucose (POC) 417 (HH) 70 - 110 mg/dL   ECHO ADULT COMPLETE    Collection Time: 12/23/18  2:02 PM   Result Value Ref Range    LA Volume 49.40 22 - 52 mL    Right Atrial Area 4C 12.62 cm2    Ao Root D 2.68 cm    AO ASC D 2.90 cm    AO ARCH D 3.27 cm    Aortic Valve Systolic Peak Velocity 504.94 cm/s    AoV VTI 23.43 cm    Aortic Valve Area by Continuity of Peak Velocity 2.5 cm2    Aortic Valve Area by Continuity of VTI 2.2 cm2    AoV PG 5.5 mmHg    LVIDd 3.97 3.9 - 5.3 cm    LVPWd 1.19 (A) 0.6 - 0.9 cm    LVIDs 2.71 cm    IVSd 1.20 (A) 0.6 - 0.9 cm    LV ED Vol A2C 54.2 mL    LV ES Vol A4C 35.7 mL    LV ES Vol BP 27.8 19 - 49 mL    LVOT d 1.88 cm    LVOT Peak Velocity 104.95 cm/s    LVOT Peak Gradient 4.4 mmHg    LVOT VTI 18.64 cm    LV E' Septal Velocity 0.07 cm/s    LV E' Lateral Velocity 0.07 cm/s    MVA (PHT) 3.3 cm2    MV A Tho 83.14 cm/s    MV E Tho 0.80 cm/s    MV E/A 0.01     RVIDd 3.36 cm    Aortic Valve Systolic Mean Gradient 3.3 mmHg    BP EF 64.3 55 - 100 %    LV Ejection Fraction MOD 4C 65 %    LV Ejection Fraction MOD 2C 66 %    LA Vol 4C 58.61 (A) 22 - 52 mL    LA Vol 2C 36.67 22 - 52 mL    LV Mass .9 (A) 67 - 162 g    LV Mass AL Index 113.7 (A) 43 - 95 g/m2    E/E' lateral 11.43     E/E' septal 11.43     IVC proximal 1.80 cm    E/E' ratio (averaged) 11.43     LV ES Vol A2C 18.6 mL    LVES Vol Index BP 16.8 mL/m2    LV ED Vol A4C 101.6 mL    LVED Vol Index BP 47.1 mL/m2    Mitral Valve E Wave Deceleration Time 230.2 ms    Mitral Valve Pressure Half-time 66.8 ms    Left Atrium Major Axis 3.25 cm    Tapse 2.40 1.5 - 2.0 cm    LV ED Vol BP 77.9 56 - 104 ml    LA Vol Index 29.89 16 - 28 ml/m2    LA Vol Index 22.19 16 - 28 ml/m2    LA Vol Index 35.47 16 - 28 ml/m2    LVED Vol Index A4C 61.5 mL/m2    LVED Vol Index A2C 32.8 mL/m2    LVES Vol Index A4C 21.6 mL/m2    LVES Vol Index A2C 11.3 mL/m2    ARMEN/BSA Pk Tho 1.5 cm2/m2    ARMEN/BSA VTI 1.3 cm2/m2   GLUCOSE, POC    Collection Time: 12/23/18  2:56 PM   Result Value Ref Range    Glucose (POC) 355 (H) 70 - 110 mg/dL   GLUCOSE, POC    Collection Time: 12/23/18  3:39 PM   Result Value Ref Range    Glucose (POC) 281 (H) 70 - 110 mg/dL   GLUCOSE, POC    Collection Time: 12/23/18  4:58 PM Result Value Ref Range    Glucose (POC) 146 (H) 70 - 110 mg/dL   GLUCOSE, POC    Collection Time: 12/23/18  9:26 PM   Result Value Ref Range    Glucose (POC) 212 (H) 70 - 162 mg/dL   METABOLIC PANEL, COMPREHENSIVE    Collection Time: 12/24/18  4:00 AM   Result Value Ref Range    Sodium 137 136 - 145 mmol/L    Potassium 3.8 3.5 - 5.5 mmol/L    Chloride 104 100 - 108 mmol/L    CO2 23 21 - 32 mmol/L    Anion gap 10 3.0 - 18 mmol/L    Glucose 118 (H) 74 - 99 mg/dL    BUN 11 7.0 - 18 MG/DL    Creatinine 0.69 0.6 - 1.3 MG/DL    BUN/Creatinine ratio 16      GFR est AA >60 >60 ml/min/1.73m2    GFR est non-AA >60 >60 ml/min/1.73m2    Calcium 8.7 8.5 - 10.1 MG/DL    Bilirubin, total 0.5 0.2 - 1.0 MG/DL    ALT (SGPT) 16 13 - 56 U/L    AST (SGOT) 12 (L) 15 - 37 U/L    Alk. phosphatase 77 45 - 117 U/L    Protein, total 7.1 6.4 - 8.2 g/dL    Albumin 3.3 (L) 3.4 - 5.0 g/dL    Globulin 3.8 2.0 - 4.0 g/dL    A-G Ratio 0.9 0.8 - 1.7     CBC WITH AUTOMATED DIFF    Collection Time: 12/24/18  4:00 AM   Result Value Ref Range    WBC 5.1 4.6 - 13.2 K/uL    RBC 5.29 4. 20 - 5.30 M/uL    HGB 12.0 12.0 - 16.0 g/dL    HCT 39.0 35.0 - 45.0 %    MCV 73.7 (L) 74.0 - 97.0 FL    MCH 22.7 (L) 24.0 - 34.0 PG    MCHC 30.8 (L) 31.0 - 37.0 g/dL    RDW 14.2 11.6 - 14.5 %    PLATELET 288 481 - 176 K/uL    MPV 11.4 9.2 - 11.8 FL    NEUTROPHILS 42 40 - 73 %    LYMPHOCYTES 47 21 - 52 %    MONOCYTES 10 3 - 10 %    EOSINOPHILS 1 0 - 5 %    BASOPHILS 0 0 - 2 %    ABS. NEUTROPHILS 2.1 1.8 - 8.0 K/UL    ABS. LYMPHOCYTES 2.4 0.9 - 3.6 K/UL    ABS. MONOCYTES 0.5 0.05 - 1.2 K/UL    ABS. EOSINOPHILS 0.1 0.0 - 0.4 K/UL    ABS. BASOPHILS 0.0 0.0 - 0.1 K/UL    DF AUTOMATED     GLUCOSE, POC    Collection Time: 12/24/18  5:54 AM   Result Value Ref Range    Glucose (POC) 124 (H) 70 - 110 mg/dL       Radiology:  Mri Brain Wo Cont    Result Date: 12/22/2018  EXAM: MRI brain without contrast  12/21/2018. CLINICAL INDICATION/HISTORY: Right foot and arm numbness.  COMPARISON: None. TECHNIQUE: Multiplanar multisequential images of the brain were obtained without intravenous contrast. _______________ FINDINGS: BRAIN AND POSTERIOR FOSSA: There is a subcentimeter area of acute infarction involving the central aspect of the left thalamus, extending inferiorly along the posterior margin of the thalamus. There is no intracranial hemorrhage, mass effect, or midline shift. Patchy abnormal signal is noted along the left side of the bela suggesting the sequela of previous infarct. There also appears to be a small area of old infarction along the posterolateral aspect of the left thalamus. Patchy areas of T2 prolongation are noted in the periventricular white matter of both cerebral hemispheres, as well as along the anterior aspect of the temporal horn of the right lateral ventricle. A small focus of T2 prolongation is also noted in the right putamen with a similar appearing focus in the left anterior putaminal region. These foci may represent chronic small vessel changes or additional areas of old infarction. There is no acute intracranial hemorrhage. There is no significant mass effect and there is no midline shift. On the gradient echo sequence, there is a punctate focus of presumed old hemorrhage in the right cerebellar hemisphere (image 8, series 7). There are no significant additional areas of abnormal parenchymal signal.  No additional regions of true restricted diffusion are demonstrable. There are no areas of restricted diffusion to suggest acute infarct. EXTRA-AXIAL SPACES AND MENINGES: There are no abnormal extra-axial fluid collections. VASCULAR: The visualized portions of the intracranial carotid and vertebrobasilar systems demonstrate patent appearing flow voids. CALVARIA: Low signal is noted throughout the calvarial marrow, possibly related to underlying anemia or other marrow replacing process. SINUSES: Clear, as visualized.  CRANIOCERVICAL JUNCTION: Similar to the calvarium, there is diffusely low signal throughout the marrow of the cervical spine. OTHER: None. _______________     IMPRESSION: 1. Small focus of acute infarct involving the left thalamus. 2.  Probable early chronic small vessel changes with old areas of infarction, given the patient's history of diabetes. However, given the patient's age, white matter changes can also be seen in the setting of a demyelinating process and correlation with the patient's neurologic history is suggested. 3.  Probable punctate focus of old hemorrhage in the right cerebellar hemisphere, possibly related to the sequela of uncontrolled hypertension or a small focus of old hemorrhagic lacunar infarction. ASSESSMENT/IMPRESSION:   Left thalamic infarct, likely secondary to small vessel disease. She has remarkable stroke risk factors with HTN and DM. Her LDL si slightly elevated.     RECOMMENDATIONS:  1. Aspirin 81mg po and atorvastatin 40mg daily . 2. Blood-pressure parameter less than 140/90, start antihypertensive medication per hospitalist team.  3. Patient can be discharged home from neurologic standpoint. Follow up neurology as outpatient. Please do not hesitate to return with any questions. Signed:   Ovidio Taylor MD  12/24/2018  9:26 AM

## 2018-12-24 NOTE — DIABETES MGMT
NUTRITIONAL ASSESSMENT GLYCEMIC CONTROL/ PLAN OF CARE     Kem Apgar           39 y.o.           2018                 1. Hypertension, unspecified type    2. Numbness    3. Left thalamic infarction ([address]    4. Hyponatremia    5. Hypokalemia      PMHx: Diabetes, HTN, Hyperlipidemia    Brief update: Pt presented to the ER w/ acute onset of numbness in R. Foot and R. Hand. MRI revealed Left thalamic infarction. INTERVENTIONS/PLAN:   -Recommend continuing Diabetic Consistent Carbohydrate Diet  -Diabetes Self-Mgt Education  -Diabetes Nutrition Education     ASSESSMENT:   Nutritional Status:  Overweight per BMI 27.83 kg/m2 (25.0-29.9 kg/m2)      Diabetes Management:     Recent Glucose Results:   Lab Results   Component Value Date/Time     (H) 2018 04:00 AM    GLUCPOC 124 (H) 2018 05:54 AM    GLUCPOC 212 (H) 2018 09:26 PM    GLUCPOC 146 (H) 2018 04:58 PM         Within target range (non-ICU: <140; ICU<180): [] Yes   [x]  No    Current Insulin regimen:    Humalog, corrective, normal insulin sensitivity   Metformin 500 mg 2x daily  Glipizide 10 mg 2x daily     Home medication/insulin regimen:   None  *7-8 months ago was taking: Metformin, Lantus, and Novolog    HbA1c:(18) 12.4% equivalent to average blood glucose level 309 mg/dL. Adequate glycemic control PTA:  [] Yes  [x] No       SUBJECTIVE/OBJECTIVE:   Information obtained from: Pt states that she was unable to take medications d/t lack of medication coverage over the last 7-8 months. Pt and her  present at time of visit.  explained that they have been approved for Medicaid, but are waiting on their cards to arrive. Pt states that she use to be on metformin, Lantus, and Novolog. Pt reports she had a few days of diarrhea when first starting her metformin, but it did resolve. Reviewed current Z3A, complications of long term high blood glucose levels, and Diabetes Nutrition Education.  Please see note for additional education details. Diet: DIET DIABETIC WITH OPTIONS      Patient Vitals for the past 100 hrs:   % Diet Eaten   12/24/18 0845 100 %   12/23/18 1925 0 %   12/23/18 1832 100 %   12/23/18 1207 100 %   12/23/18 0815 100 %   12/22/18 1915 0 %   12/22/18 1738 100 %   12/22/18 1209 85 %   12/22/18 0811 100 %         Medications: [x]                Reviewed     Most Recent POC Glucose:   Recent Labs     12/24/18  0400 12/22/18  1155 12/21/18  1732   * 205* 324*         Labs:   Lab Results   Component Value Date/Time    Hemoglobin A1c 12.4 (H) 12/21/2018 05:32 PM     Lab Results   Component Value Date/Time    Sodium 137 12/24/2018 04:00 AM    Potassium 3.8 12/24/2018 04:00 AM    Chloride 104 12/24/2018 04:00 AM    CO2 23 12/24/2018 04:00 AM    Anion gap 10 12/24/2018 04:00 AM    Glucose 118 (H) 12/24/2018 04:00 AM    BUN 11 12/24/2018 04:00 AM    Creatinine 0.69 12/24/2018 04:00 AM    Calcium 8.7 12/24/2018 04:00 AM    Albumin 3.3 (L) 12/24/2018 04:00 AM       Anthropometrics:BMI (calculated): 27.8  Wt Readings from Last 1 Encounters:   12/24/18 66.8 kg (147 lb 4.3 oz)      Ht Readings from Last 1 Encounters:   12/23/18 5' 1\" (1.549 m)       Estimated Nutrition Needs: 1789 kcal/day (25 kcal/kg), 72 g PRO/day ( 1 g PRO/kg), 1789 mL fluid/day (1 mL fluid/  kcal)  Based on:[x]            Adjusted BW: 71.59 kg      Nutrition Diagnoses: Altered nutrition-related lab value related to lack of medication lack of insurance as evidenced by A1C 12.4%. Nutrition Interventions:   -Recommend continuing consistent carbohydrate diet  -Provide Diabetes nutrition education. Goal:   Blood glucose will be within target range of  mg/dL by 12/27/18.        Nutrition Monitoring and Evaluation      []     Monitor po intake on meal rounds  [x]     Continue inpatient monitoring and intervention  []     Other:      Nutrition Risk:  []   High     []  Moderate    [x]  Minimal/Uncompromised    Adri Rosenthal ANA [place]  [facility] [phone]

## 2018-12-24 NOTE — DISCHARGE INSTRUCTIONS
Learning About How to Prevent Another Stroke  What can you do to prevent another stroke? After a stroke, people feel lots of different emotions. Some people are worried that they could have another stroke. Or they may feel overwhelmed by how much there is to learn and do. Some people feel sad or depressed. No matter what emotions you are feeling, you can give yourself some control and peace of mind by making a plan to lower your risk of having another stroke. Take your medicines  You'll need to take medicines to help prevent another stroke. Be sure to take your medicines exactly as prescribed. And don't stop taking them unless your doctor tells you to. If you stop taking your medicines, you can increase your risk of having another stroke. Some of the medicines your doctor may prescribe include:  · Aspirin or some other blood thinner to prevent blood clots. · Statins to lower cholesterol. · Blood pressure medicines to lower blood pressure. Manage other health problems  You can help lower your chance of having another stroke by managing certain other health problems. Problems that increase your risk of having another stroke include:  · High blood pressure. · High cholesterol. · Atrial fibrillation. · Diabetes. If you have any of these health problems, you can manage them with healthy lifestyle changes along with medicine. Adopt a healthy lifestyle  · Do not smoke or allow others to smoke around you. If you need help quitting, talk to your doctor about stop-smoking programs and medicines. These can increase your chances of quitting for good. Smoking makes a stroke more likely. · Limit alcohol to 2 drinks a day for men and 1 drink a day for women. · Lose weight if you need to. Controlling your weight will help you keep your heart and body healthy. · Be active. Ask your doctor what type and level of activity is safe for you. · Eat heart-healthy foods, like fruits, vegetables, and high-fiber foods.   It's also important to:  · Get a flu shot every year. · Ask for help if you think you are depressed. Do stroke rehab  Taking part in a stroke rehabilitation (rehab) program will help you to regain skills you lost or make the most of your abilities after a stroke. It also helps you take steps to prevent another stroke. Your rehab team will give you education and support to help you build new, healthy habits. You'll learn how to manage any other health problems that you might have. Gian Elizabeth also learn how to exercise safely, eat a healthy diet, and quit smoking if you smoke. Gian Elizabeth work with your team to decide what lifestyle choices are best for you. If your doctor hasn't already suggested it, ask him or her if stroke rehab is right for you. Know stroke symptoms  Make sure you know the symptoms of stroke. FAST is a simple way to remember. Recognizing these symptoms helps you know when to call for medical help. FAST stands for:  · Face drooping. · Arm weakness. · Speech difficulty. · Time to call 911. Follow-up care is a key part of your treatment and safety. Be sure to make and go to all appointments, and call your doctor if you are having problems. It's also a good idea to know your test results and keep a list of the medicines you take. Where can you learn more? Go to http://kolby-alpa.info/. Enter V093 in the search box to learn more about \"Learning About How to Prevent Another Stroke. \"  Current as of: November 21, 2017  Content Version: 11.8  © 6888-0583 [employer]. Care instructions adapted under license by [employer] (which disclaims liability or warranty for this information). If you have questions about a medical condition or this instruction, always ask your healthcare professional. [address] any warranty or liability for your use of this information.

## 2018-12-25 ENCOUNTER — HOME CARE VISIT (OUTPATIENT)
Dept: SCHEDULING | Facility: HOME HEALTH | Age: 43
End: 2018-12-25
Payer: MEDICAID

## 2018-12-25 VITALS
DIASTOLIC BLOOD PRESSURE: 102 MMHG | SYSTOLIC BLOOD PRESSURE: 150 MMHG | TEMPERATURE: 97.3 F | OXYGEN SATURATION: 98 % | RESPIRATION RATE: 14 BRPM | HEART RATE: 107 BPM

## 2018-12-25 PROCEDURE — 400013 HH SOC

## 2018-12-25 PROCEDURE — G0299 HHS/HOSPICE OF RN EA 15 MIN: HCPCS

## 2018-12-26 ENCOUNTER — HOME CARE VISIT (OUTPATIENT)
Dept: HOME HEALTH SERVICES | Facility: HOME HEALTH | Age: 43
End: 2018-12-26
Payer: MEDICAID

## 2018-12-28 ENCOUNTER — HOME CARE VISIT (OUTPATIENT)
Dept: SCHEDULING | Facility: HOME HEALTH | Age: 43
End: 2018-12-28
Payer: MEDICAID

## 2018-12-28 ENCOUNTER — HOME CARE VISIT (OUTPATIENT)
Dept: HOME HEALTH SERVICES | Facility: HOME HEALTH | Age: 43
End: 2018-12-28
Payer: MEDICAID

## 2018-12-28 VITALS
DIASTOLIC BLOOD PRESSURE: 85 MMHG | TEMPERATURE: 97.8 F | SYSTOLIC BLOOD PRESSURE: 145 MMHG | RESPIRATION RATE: 17 BRPM | HEART RATE: 101 BPM

## 2018-12-28 PROCEDURE — G0151 HHCP-SERV OF PT,EA 15 MIN: HCPCS

## 2018-12-29 RX ORDER — GLIPIZIDE 10 MG/1
10 TABLET ORAL
Qty: 60 TAB | Refills: 0 | Status: SHIPPED | OUTPATIENT
Start: 2018-12-29

## 2019-01-02 ENCOUNTER — HOME CARE VISIT (OUTPATIENT)
Dept: HOME HEALTH SERVICES | Facility: HOME HEALTH | Age: 44
End: 2019-01-02
Payer: MEDICAID

## 2019-01-02 ENCOUNTER — HOME CARE VISIT (OUTPATIENT)
Dept: SCHEDULING | Facility: HOME HEALTH | Age: 44
End: 2019-01-02
Payer: MEDICAID

## 2019-01-02 VITALS
RESPIRATION RATE: 14 BRPM | HEART RATE: 97 BPM | DIASTOLIC BLOOD PRESSURE: 99 MMHG | SYSTOLIC BLOOD PRESSURE: 151 MMHG | OXYGEN SATURATION: 99 % | TEMPERATURE: 97.7 F

## 2019-01-02 PROCEDURE — 400013 HH SOC

## 2019-01-02 PROCEDURE — G0495 RN CARE TRAIN/EDU IN HH: HCPCS

## 2019-01-03 ENCOUNTER — HOSPITAL ENCOUNTER (EMERGENCY)
Age: 44
Discharge: HOME | End: 2019-01-03
Attending: EMERGENCY MEDICINE
Payer: MEDICAID

## 2019-01-03 ENCOUNTER — HOME CARE VISIT (OUTPATIENT)
Dept: SCHEDULING | Facility: HOME HEALTH | Age: 44
End: 2019-01-03
Payer: MEDICAID

## 2019-01-03 VITALS
SYSTOLIC BLOOD PRESSURE: 155 MMHG | OXYGEN SATURATION: 100 % | DIASTOLIC BLOOD PRESSURE: 103 MMHG | RESPIRATION RATE: 14 BRPM | BODY MASS INDEX: 30.21 KG/M2 | TEMPERATURE: 98.1 F | HEART RATE: 103 BPM | WEIGHT: 160 LBS | HEIGHT: 61 IN

## 2019-01-03 DIAGNOSIS — I10 HYPERTENSION, UNSPECIFIED TYPE: Primary | ICD-10-CM

## 2019-01-03 DIAGNOSIS — Z76.0 MEDICATION REFILL: ICD-10-CM

## 2019-01-03 PROCEDURE — G0157 HHC PT ASSISTANT EA 15: HCPCS

## 2019-01-03 PROCEDURE — 99282 EMERGENCY DEPT VISIT SF MDM: CPT

## 2019-01-03 RX ORDER — LISINOPRIL 20 MG/1
20 TABLET ORAL DAILY
Qty: 7 TAB | Refills: 0 | Status: SHIPPED | OUTPATIENT
Start: 2019-01-03 | End: 2019-01-10

## 2019-01-03 RX ORDER — ATORVASTATIN CALCIUM 40 MG/1
40 TABLET, FILM COATED ORAL DAILY
Qty: 7 TAB | Refills: 0 | Status: SHIPPED | OUTPATIENT
Start: 2019-01-03 | End: 2019-01-10

## 2019-01-03 RX ORDER — AMLODIPINE BESYLATE 10 MG/1
10 TABLET ORAL DAILY
Qty: 7 TAB | Refills: 0 | Status: SHIPPED | OUTPATIENT
Start: 2019-01-03 | End: 2019-01-10

## 2019-01-03 RX ORDER — METFORMIN HYDROCHLORIDE 500 MG/1
500 TABLET ORAL 2 TIMES DAILY WITH MEALS
Qty: 14 TAB | Refills: 0 | Status: SHIPPED | OUTPATIENT
Start: 2019-01-03 | End: 2019-01-10

## 2019-01-03 NOTE — ED TRIAGE NOTES
[facility] she was instructed to go to ED today for elevated b/p when physical therapy came to her home this morning. [facility] she ran out of her meds today. [facility] she recently moved here and hasn't been able to see a PCP yet, has appointment with Dr. Mark Hael on Jan 9. [facility] office wouldn't refill b/p, diabetes and cholesterol meds until she is seen by MD. Recently seen here for CVA.

## 2019-01-03 NOTE — ED PROVIDER NOTES
EMERGENCY DEPARTMENT HISTORY AND PHYSICAL EXAM 
 
Date: 1/3/2019 Patient Name: Salma Combs History of Presenting Illness Chief Complaint Patient presents with  Hypertension History Provided By: Patient Chief Complaint: elevated blood pressure Duration: earlier today Timing:  Acute Location: blood pressure Quality: elevated Modifying Factors: took her Norvasc and Lisinopril Associated Symptoms: denies any other associated signs or symptoms Additional History (Context):  
12:36 PM  
Salma Combs is a 39 y.o. female with PMHX of HTN who presents to the emergency department C/O elevated blood pressure noted earlier today by her physical therapist. The patient has been compliant with her hypertensive medications, stating she took her medications this morning and has now run out as she only had a 10 day supply when she was recently admitted for a CVA. She was waiting for her insurance to start on 1/1/2019. She has an appointment with Roz Phan MD on 1/9 (in 6 days). She would like medication refills for Norvasc 10 mg, Lisinopril 20 mg, Metformin 500 mg, and Atorvastatin 40 mg. Pt denies fever, chills, headache, visual changes, nausea, chest pain, shortness of breath, and any other sxs or complaints. PCP: None Current Outpatient Medications Medication Sig Dispense Refill  metFORMIN (GLUCOPHAGE) 500 mg tablet Take 1 Tab by mouth two (2) times daily (with meals) for 7 days. 14 Tab 0  
 atorvastatin (LIPITOR) 40 mg tablet Take 1 Tab by mouth daily for 7 days. 7 Tab 0  
 lisinopril (PRINIVIL, ZESTRIL) 20 mg tablet Take 1 Tab by mouth daily for 7 days. 7 Tab 0  
 amLODIPine (NORVASC) 10 mg tablet Take 1 Tab by mouth daily for 7 days. 7 Tab 0  
 glipiZIDE (GLUCOTROL) 10 mg tablet Take 1 Tab by mouth Before breakfast and dinner. 60 Tab 0  
 aspirin delayed-release 81 mg tablet Take 1 Tab by mouth daily. 30 Tab 3 Past History Past Medical History: Past Medical History:  
Diagnosis Date  Diabetes ([facility] [address]  Hypercholesteremia  Hypertension  Stroke ([facility] [address] Past Surgical History: 
Past Surgical History:  
Procedure Laterality Date  HX GYN    
 c section Family History: No family history on file. Social History: 
Social History Tobacco Use  Smoking status: Never Smoker  Smokeless tobacco: Never Used Substance Use Topics  Alcohol use: Yes Comment: socially  Drug use: No  
 
 
Allergies: 
No Known Allergies Review of Systems Review of Systems Constitutional: Negative for chills and fever. Eyes: Negative for visual disturbance. Respiratory: Negative for shortness of breath. Cardiovascular: Negative for chest pain. Gastrointestinal: Negative for nausea. Neurological: Negative for headaches. All other systems reviewed and are negative. Physical Exam  
 
Vitals:  
 01/03/19 1203 BP: (!) 155/103 Pulse: (!) 103 Resp: 14 Temp: 98.1 °F (36.7 °C) SpO2: 100% Weight: 72.6 kg (160 lb) Height: 5' 1\" (1.549 m) Physical Exam  
Nursing note and vitals reviewed. Constitutional: Alert. Well appearing, no acute distress Head: Normocephalic, Atraumatic Eyes: Pupils are equal, round, and reactive to light, EOMI 
ENT: Moist mucous membranes, oropharynx clear. Neck: Supple, non-tender Cardiovascular: Regular rate and rhythm, no murmurs, rubs, or gallops Chest: Normal work of breathing and chest excursion bilaterally. No reproducible chest tenderness. Lungs: Clear to ausculation bilaterally. Abdomen: Soft, non tender, non distended Back: No evidence of trauma or deformity. No CVA Tenderness. Extremities: No evidence of trauma or deformity, no LE edema Skin: Warm and dry Neuro: Alert and appropriate, facial movement symmetric, normal speech, strength and sensation full and symmetric bilaterally, normal gait, normal coordination Psychiatric: Normal mood and affect Diagnostic Study Results Labs - No results found for this or any previous visit (from the past 12 hour(s)). Radiologic Studies - No orders to display Medications given in the ED- Medications - No data to display Medical Decision Making I am the first provider for this patient. I reviewed the vital signs, available nursing notes, past medical history, past surgical history, family history and social history. Vital Signs-Reviewed the patient's vital signs. Pulse Oximetry Analysis - 100% on room air Records Reviewed: Nursing Notes and Old Medical Records Provider Notes (Medical Decision Making): 39year old female coming in for a medication refill. She was told her blood pressure was high and she cannot follow up with her PCM until January 9th. Given that she just had a stroke, will refill her medications for 1 week. She has no symptoms; no imaging or lab work was done. Patient was told to return if she has symptoms. Procedures: 
Procedures ED Course:  
12:36 PM Initial assessment performed. The patients presenting problems have been discussed, and they are in agreement with the care plan formulated and outlined with them. I have encouraged them to ask questions as they arise throughout their visit. Diagnosis and Disposition DISCHARGE NOTE: 
12:45 PM  
Gwen Player  results have been reviewed with her. She has been counseled regarding her diagnosis, treatment, and plan. She verbally conveys understanding and agreement of the signs, symptoms, diagnosis, treatment and prognosis and additionally agrees to follow up as discussed. She also agrees with the care-plan and conveys that all of her questions have been answered.   I have also provided discharge instructions for her that include: educational information regarding their diagnosis and treatment, and list of reasons why they would want to return to the ED prior to their follow-up appointment, should her condition change. She has been provided with education for proper emergency department utilization. CLINICAL IMPRESSION: 
 
1. Hypertension, unspecified type 2. Medication refill PLAN: 
1. D/C Home 2. Current Discharge Medication List  
  
CONTINUE these medications which have CHANGED Details  
metFORMIN (GLUCOPHAGE) 500 mg tablet Take 1 Tab by mouth two (2) times daily (with meals) for 7 days. Qty: 14 Tab, Refills: 0  
  
atorvastatin (LIPITOR) 40 mg tablet Take 1 Tab by mouth daily for 7 days. Qty: 7 Tab, Refills: 0  
  
lisinopril (PRINIVIL, ZESTRIL) 20 mg tablet Take 1 Tab by mouth daily for 7 days. Qty: 7 Tab, Refills: 0  
  
amLODIPine (NORVASC) 10 mg tablet Take 1 Tab by mouth daily for 7 days. Qty: 7 Tab, Refills: 0  
  
  
 
3. Follow-up Information Follow up With Specialties Details Why Contact Info Lauren Bateman MD Internal Medicine Go on 1/9/2019 For your primary care appointment as scheduled [address] [address] 
[phone] [facility] EMERGENCY DEPT Emergency Medicine  As needed, If symptoms worsen [address] [id number] 
[phone]  
  
 
_______________________________ Attestations: This note is prepared by Osei Fredeman, acting as Scribe for Anupam Galeana MD. Anupam Galeana MD:  The scribe's documentation has been prepared under my direction and personally reviewed by me in its entirety. I confirm that the note above accurately reflects all work, treatment, procedures, and medical decision making performed by me. 
_______________________________

## 2019-01-03 NOTE — DISCHARGE INSTRUCTIONS
High Blood Pressure: Care Instructions  Your Care Instructions    If your blood pressure is usually above 130/80, you have high blood pressure, or hypertension. That means the top number is 130 or higher or the bottom number is 80 or higher, or both. Despite what a lot of people think, high blood pressure usually doesn't cause headaches or make you feel dizzy or lightheaded. It usually has no symptoms. But it does increase your risk for heart attack, stroke, and kidney or eye damage. The higher your blood pressure, the more your risk increases. Your doctor will give you a goal for your blood pressure. Your goal will be based on your health and your age. Lifestyle changes, such as eating healthy and being active, are always important to help lower blood pressure. You might also take medicine to reach your blood pressure goal.  Follow-up care is a key part of your treatment and safety. Be sure to make and go to all appointments, and call your doctor if you are having problems. It's also a good idea to know your test results and keep a list of the medicines you take. How can you care for yourself at home? Medical treatment  · If you stop taking your medicine, your blood pressure will go back up. You may take one or more types of medicine to lower your blood pressure. Be safe with medicines. Take your medicine exactly as prescribed. Call your doctor if you think you are having a problem with your medicine. · Talk to your doctor before you start taking aspirin every day. Aspirin can help certain people lower their risk of a heart attack or stroke. But taking aspirin isn't right for everyone, because it can cause serious bleeding. · See your doctor regularly. You may need to see the doctor more often at first or until your blood pressure comes down. · If you are taking blood pressure medicine, talk to your doctor before you take decongestants or anti-inflammatory medicine, such as ibuprofen.  Some of these medicines can raise blood pressure. · Learn how to check your blood pressure at home. Lifestyle changes  · Stay at a healthy weight. This is especially important if you put on weight around the waist. Losing even 10 pounds can help you lower your blood pressure. · If your doctor recommends it, get more exercise. Walking is a good choice. Bit by bit, increase the amount you walk every day. Try for at least 30 minutes on most days of the week. You also may want to swim, bike, or do other activities. · Avoid or limit alcohol. Talk to your doctor about whether you can drink any alcohol. · Try to limit how much sodium you eat to less than 2,300 milligrams (mg) a day. Your doctor may ask you to try to eat less than 1,500 mg a day. · Eat plenty of fruits (such as bananas and oranges), vegetables, legumes, whole grains, and low-fat dairy products. · Lower the amount of saturated fat in your diet. Saturated fat is found in animal products such as milk, cheese, and meat. Limiting these foods may help you lose weight and also lower your risk for heart disease. · Do not smoke. Smoking increases your risk for heart attack and stroke. If you need help quitting, talk to your doctor about stop-smoking programs and medicines. These can increase your chances of quitting for good. When should you call for help? Call 911 anytime you think you may need emergency care. This may mean having symptoms that suggest that your blood pressure is causing a serious heart or blood vessel problem. Your blood pressure may be over 180/120.   For example, call 911 if:    · You have symptoms of a heart attack. These may include:  ? Chest pain or pressure, or a strange feeling in the chest.  ? Sweating. ? Shortness of breath. ? Nausea or vomiting. ? Pain, pressure, or a strange feeling in the back, neck, jaw, or upper belly or in one or both shoulders or arms. ? Lightheadedness or sudden weakness.   ? A fast or irregular heartbeat.     · You have symptoms of a stroke. These may include:  ? Sudden numbness, tingling, weakness, or loss of movement in your face, arm, or leg, especially on only one side of your body. ? Sudden vision changes. ? Sudden trouble speaking. ? Sudden confusion or trouble understanding simple statements. ? Sudden problems with walking or balance. ? A sudden, severe headache that is different from past headaches.     · You have severe back or belly pain.    Do not wait until your blood pressure comes down on its own. Get help right away.   Call your doctor now or seek immediate care if:    · Your blood pressure is much higher than normal (such as 180/120 or higher), but you don't have symptoms.     · You think high blood pressure is causing symptoms, such as:  ? Severe headache.  ? Blurry vision.    Watch closely for changes in your health, and be sure to contact your doctor if:    · Your blood pressure measures higher than your doctor recommends at least 2 times. That means the top number is higher or the bottom number is higher, or both.     · You think you may be having side effects from your blood pressure medicine. Where can you learn more? Go to http://kolby-alpa.info/. Enter P014 in the search box to learn more about \"High Blood Pressure: Care Instructions. \"  Current as of: December 6, 2017  Content Version: 11.8  © 5728-9509 [employer]. Care instructions adapted under license by [employer] (which disclaims liability or warranty for this information). If you have questions about a medical condition or this instruction, always ask your healthcare professional. [place] [address] any warranty or liability for your use of this information.

## 2019-01-04 ENCOUNTER — HOME CARE VISIT (OUTPATIENT)
Dept: SCHEDULING | Facility: HOME HEALTH | Age: 44
End: 2019-01-04
Payer: MEDICAID

## 2019-01-04 PROCEDURE — G0157 HHC PT ASSISTANT EA 15: HCPCS

## 2019-01-07 VITALS
TEMPERATURE: 98.1 F | HEART RATE: 97 BPM | SYSTOLIC BLOOD PRESSURE: 151 MMHG | TEMPERATURE: 97.3 F | SYSTOLIC BLOOD PRESSURE: 137 MMHG | DIASTOLIC BLOOD PRESSURE: 95 MMHG | OXYGEN SATURATION: 98 % | DIASTOLIC BLOOD PRESSURE: 108 MMHG | OXYGEN SATURATION: 98 % | HEART RATE: 115 BPM

## 2019-01-08 ENCOUNTER — HOME CARE VISIT (OUTPATIENT)
Dept: SCHEDULING | Facility: HOME HEALTH | Age: 44
End: 2019-01-08
Payer: MEDICAID

## 2019-01-08 PROCEDURE — G0495 RN CARE TRAIN/EDU IN HH: HCPCS

## 2019-01-09 ENCOUNTER — HOME CARE VISIT (OUTPATIENT)
Dept: SCHEDULING | Facility: HOME HEALTH | Age: 44
End: 2019-01-09
Payer: MEDICAID

## 2019-01-09 VITALS — SYSTOLIC BLOOD PRESSURE: 160 MMHG | DIASTOLIC BLOOD PRESSURE: 108 MMHG

## 2019-01-09 PROCEDURE — G0157 HHC PT ASSISTANT EA 15: HCPCS

## 2019-01-11 ENCOUNTER — HOME CARE VISIT (OUTPATIENT)
Dept: SCHEDULING | Facility: HOME HEALTH | Age: 44
End: 2019-01-11
Payer: MEDICAID

## 2019-01-11 PROCEDURE — G0151 HHCP-SERV OF PT,EA 15 MIN: HCPCS

## 2019-01-12 VITALS
TEMPERATURE: 97.3 F | OXYGEN SATURATION: 97 % | HEART RATE: 98 BPM | DIASTOLIC BLOOD PRESSURE: 115 MMHG | RESPIRATION RATE: 16 BRPM | SYSTOLIC BLOOD PRESSURE: 160 MMHG

## 2019-01-14 VITALS
OXYGEN SATURATION: 97 % | TEMPERATURE: 97.4 F | SYSTOLIC BLOOD PRESSURE: 150 MMHG | DIASTOLIC BLOOD PRESSURE: 107 MMHG | HEART RATE: 102 BPM

## 2019-01-22 ENCOUNTER — HOME CARE VISIT (OUTPATIENT)
Dept: HOME HEALTH SERVICES | Facility: HOME HEALTH | Age: 44
End: 2019-01-22
Payer: MEDICAID

## 2019-01-30 LAB
ATRIAL RATE: 94 BPM
CALCULATED P AXIS, ECG09: 63 DEGREES
CALCULATED R AXIS, ECG10: 3 DEGREES
CALCULATED T AXIS, ECG11: 43 DEGREES
DIAGNOSIS, 93000: NORMAL
P-R INTERVAL, ECG05: 174 MS
Q-T INTERVAL, ECG07: 366 MS
QRS DURATION, ECG06: 86 MS
QTC CALCULATION (BEZET), ECG08: 457 MS
VENTRICULAR RATE, ECG03: 94 BPM

## 2020-07-30 ENCOUNTER — APPOINTMENT (OUTPATIENT)
Dept: GENERAL RADIOLOGY | Age: 45
End: 2020-07-30
Attending: PHYSICIAN ASSISTANT
Payer: MEDICAID

## 2020-07-30 ENCOUNTER — APPOINTMENT (OUTPATIENT)
Dept: CT IMAGING | Age: 45
End: 2020-07-30
Attending: PHYSICIAN ASSISTANT
Payer: MEDICAID

## 2020-07-30 ENCOUNTER — HOSPITAL ENCOUNTER (EMERGENCY)
Age: 45
Discharge: OTHER INSTITUTION | End: 2020-07-30
Attending: EMERGENCY MEDICINE
Payer: MEDICAID

## 2020-07-30 VITALS
HEIGHT: 61 IN | RESPIRATION RATE: 18 BRPM | WEIGHT: 144 LBS | HEART RATE: 120 BPM | OXYGEN SATURATION: 99 % | TEMPERATURE: 98 F | DIASTOLIC BLOOD PRESSURE: 87 MMHG | BODY MASS INDEX: 27.19 KG/M2 | SYSTOLIC BLOOD PRESSURE: 159 MMHG

## 2020-07-30 DIAGNOSIS — I62.9 INTRACRANIAL HEMORRHAGE (HCC): Primary | ICD-10-CM

## 2020-07-30 DIAGNOSIS — I10 UNCONTROLLED HYPERTENSION: ICD-10-CM

## 2020-07-30 LAB
ALBUMIN SERPL-MCNC: 3.8 G/DL (ref 3.4–5)
ALBUMIN/GLOB SERPL: 0.8 {RATIO} (ref 0.8–1.7)
ALP SERPL-CCNC: 130 U/L (ref 45–117)
ALT SERPL-CCNC: 14 U/L (ref 13–56)
ANION GAP SERPL CALC-SCNC: 17 MMOL/L (ref 3–18)
APPEARANCE UR: CLEAR
APTT PPP: 26.7 SEC (ref 23–36.4)
AST SERPL-CCNC: 10 U/L (ref 10–38)
BACTERIA URNS QL MICRO: ABNORMAL /HPF
BASOPHILS # BLD: 0 K/UL (ref 0–0.1)
BASOPHILS NFR BLD: 0 % (ref 0–2)
BILIRUB SERPL-MCNC: 0.9 MG/DL (ref 0.2–1)
BILIRUB UR QL: NEGATIVE
BUN SERPL-MCNC: 14 MG/DL (ref 7–18)
BUN/CREAT SERPL: 13 (ref 12–20)
CALCIUM SERPL-MCNC: 10 MG/DL (ref 8.5–10.1)
CHLORIDE SERPL-SCNC: 97 MMOL/L (ref 100–111)
CK MB CFR SERPL CALC: NORMAL % (ref 0–4)
CK MB SERPL-MCNC: <1 NG/ML (ref 5–25)
CK SERPL-CCNC: 56 U/L (ref 26–192)
CO2 SERPL-SCNC: 19 MMOL/L (ref 21–32)
COLOR UR: YELLOW
CREAT SERPL-MCNC: 1.04 MG/DL (ref 0.6–1.3)
DIFFERENTIAL METHOD BLD: ABNORMAL
EOSINOPHIL # BLD: 0 K/UL (ref 0–0.4)
EOSINOPHIL NFR BLD: 0 % (ref 0–5)
EPITH CASTS URNS QL MICRO: ABNORMAL /LPF (ref 0–5)
ERYTHROCYTE [DISTWIDTH] IN BLOOD BY AUTOMATED COUNT: 13.5 % (ref 11.6–14.5)
EST. AVERAGE GLUCOSE BLD GHB EST-MCNC: 318 MG/DL
GLOBULIN SER CALC-MCNC: 4.7 G/DL (ref 2–4)
GLUCOSE SERPL-MCNC: 333 MG/DL (ref 74–99)
GLUCOSE UR STRIP.AUTO-MCNC: >1000 MG/DL
GRAN CASTS URNS QL MICRO: ABNORMAL /LPF
HBA1C MFR BLD: 12.7 % (ref 4.2–5.6)
HCG UR QL: NEGATIVE
HCT VFR BLD AUTO: 41.9 % (ref 35–45)
HGB BLD-MCNC: 12.6 G/DL (ref 12–16)
HGB UR QL STRIP: ABNORMAL
HYALINE CASTS URNS QL MICRO: ABNORMAL /LPF (ref 0–2)
INR PPP: 1.1 (ref 0.8–1.2)
KETONES UR QL STRIP.AUTO: 80 MG/DL
LEUKOCYTE ESTERASE UR QL STRIP.AUTO: ABNORMAL
LYMPHOCYTES # BLD: 0.9 K/UL (ref 0.9–3.6)
LYMPHOCYTES NFR BLD: 10 % (ref 21–52)
MAGNESIUM SERPL-MCNC: 1.9 MG/DL (ref 1.6–2.6)
MCH RBC QN AUTO: 22.8 PG (ref 24–34)
MCHC RBC AUTO-ENTMCNC: 30.1 G/DL (ref 31–37)
MCV RBC AUTO: 75.9 FL (ref 74–97)
MONOCYTES # BLD: 0.2 K/UL (ref 0.05–1.2)
MONOCYTES NFR BLD: 2 % (ref 3–10)
MUCOUS THREADS URNS QL MICRO: ABNORMAL /LPF
NEUTS SEG # BLD: 8 K/UL (ref 1.8–8)
NEUTS SEG NFR BLD: 88 % (ref 40–73)
NITRITE UR QL STRIP.AUTO: NEGATIVE
PH UR STRIP: 5 [PH] (ref 5–8)
PLATELET # BLD AUTO: 405 K/UL (ref 135–420)
PMV BLD AUTO: 11 FL (ref 9.2–11.8)
POTASSIUM SERPL-SCNC: 3.6 MMOL/L (ref 3.5–5.5)
PROT SERPL-MCNC: 8.5 G/DL (ref 6.4–8.2)
PROT UR STRIP-MCNC: 100 MG/DL
PROTHROMBIN TIME: 13.8 SEC (ref 11.5–15.2)
RBC # BLD AUTO: 5.52 M/UL (ref 4.2–5.3)
RBC #/AREA URNS HPF: ABNORMAL /HPF (ref 0–5)
SODIUM SERPL-SCNC: 133 MMOL/L (ref 136–145)
SP GR UR REFRACTOMETRY: 1.03 (ref 1–1.03)
TROPONIN I SERPL-MCNC: <0.02 NG/ML (ref 0–0.04)
UROBILINOGEN UR QL STRIP.AUTO: 1 EU/DL (ref 0.2–1)
WBC # BLD AUTO: 9.1 K/UL (ref 4.6–13.2)
WBC URNS QL MICRO: ABNORMAL /HPF (ref 0–5)

## 2020-07-30 PROCEDURE — 96365 THER/PROPH/DIAG IV INF INIT: CPT

## 2020-07-30 PROCEDURE — 83036 HEMOGLOBIN GLYCOSYLATED A1C: CPT

## 2020-07-30 PROCEDURE — 81001 URINALYSIS AUTO W/SCOPE: CPT

## 2020-07-30 PROCEDURE — 85025 COMPLETE CBC W/AUTO DIFF WBC: CPT

## 2020-07-30 PROCEDURE — 81025 URINE PREGNANCY TEST: CPT

## 2020-07-30 PROCEDURE — 85610 PROTHROMBIN TIME: CPT

## 2020-07-30 PROCEDURE — 85730 THROMBOPLASTIN TIME PARTIAL: CPT

## 2020-07-30 PROCEDURE — 99285 EMERGENCY DEPT VISIT HI MDM: CPT

## 2020-07-30 PROCEDURE — 70450 CT HEAD/BRAIN W/O DYE: CPT

## 2020-07-30 PROCEDURE — 83735 ASSAY OF MAGNESIUM: CPT

## 2020-07-30 PROCEDURE — 74011000250 HC RX REV CODE- 250: Performed by: PHYSICIAN ASSISTANT

## 2020-07-30 PROCEDURE — 80053 COMPREHEN METABOLIC PANEL: CPT

## 2020-07-30 PROCEDURE — 74011250636 HC RX REV CODE- 250/636: Performed by: PHYSICIAN ASSISTANT

## 2020-07-30 PROCEDURE — 71045 X-RAY EXAM CHEST 1 VIEW: CPT

## 2020-07-30 PROCEDURE — 96375 TX/PRO/DX INJ NEW DRUG ADDON: CPT

## 2020-07-30 PROCEDURE — 93005 ELECTROCARDIOGRAM TRACING: CPT

## 2020-07-30 PROCEDURE — 96376 TX/PRO/DX INJ SAME DRUG ADON: CPT

## 2020-07-30 PROCEDURE — 82550 ASSAY OF CK (CPK): CPT

## 2020-07-30 RX ORDER — ONDANSETRON 2 MG/ML
4 INJECTION INTRAMUSCULAR; INTRAVENOUS
Status: COMPLETED | OUTPATIENT
Start: 2020-07-30 | End: 2020-07-30

## 2020-07-30 RX ORDER — NICARDIPINE HYDROCHLORIDE 0.1 MG/ML
5-15 INJECTION INTRAVENOUS
Status: COMPLETED | OUTPATIENT
Start: 2020-07-30 | End: 2020-07-30

## 2020-07-30 RX ORDER — GABAPENTIN 300 MG/1
300 CAPSULE ORAL 3 TIMES DAILY
COMMUNITY
Start: 2019-04-10

## 2020-07-30 RX ORDER — MORPHINE SULFATE 2 MG/ML
4 INJECTION, SOLUTION INTRAMUSCULAR; INTRAVENOUS
Status: COMPLETED | OUTPATIENT
Start: 2020-07-30 | End: 2020-07-30

## 2020-07-30 RX ORDER — LEVETIRACETAM 10 MG/ML
1000 INJECTION INTRAVASCULAR
Status: COMPLETED | OUTPATIENT
Start: 2020-07-30 | End: 2020-07-30

## 2020-07-30 RX ORDER — AMLODIPINE BESYLATE 10 MG/1
10 TABLET ORAL DAILY
COMMUNITY
Start: 2019-04-10

## 2020-07-30 RX ORDER — METOPROLOL TARTRATE 50 MG/1
50 TABLET ORAL 2 TIMES DAILY
COMMUNITY
Start: 2019-04-10

## 2020-07-30 RX ORDER — ATORVASTATIN CALCIUM 40 MG/1
40 TABLET, FILM COATED ORAL DAILY
COMMUNITY
Start: 2019-04-10

## 2020-07-30 RX ORDER — LISINOPRIL 20 MG/1
20 TABLET ORAL DAILY
COMMUNITY
Start: 2019-04-10

## 2020-07-30 RX ORDER — METFORMIN HYDROCHLORIDE 500 MG/1
500 TABLET ORAL 2 TIMES DAILY
COMMUNITY
Start: 2019-04-10

## 2020-07-30 RX ORDER — HYDROCHLOROTHIAZIDE 25 MG/1
25 TABLET ORAL DAILY
COMMUNITY
Start: 2019-04-10

## 2020-07-30 RX ADMIN — SODIUM CHLORIDE 1000 ML: 900 INJECTION, SOLUTION INTRAVENOUS at 14:09

## 2020-07-30 RX ADMIN — ONDANSETRON 4 MG: 2 INJECTION INTRAMUSCULAR; INTRAVENOUS at 14:09

## 2020-07-30 RX ADMIN — MORPHINE SULFATE 4 MG: 2 INJECTION, SOLUTION INTRAMUSCULAR; INTRAVENOUS at 15:48

## 2020-07-30 RX ADMIN — LEVETIRACETAM 1000 MG: 10 INJECTION INTRAVENOUS at 15:34

## 2020-07-30 RX ADMIN — NICARDIPINE HYDROCHLORIDE 5 MG/HR: 0.1 INJECTION, SOLUTION INTRAVENOUS at 16:03

## 2020-07-30 RX ADMIN — ONDANSETRON 4 MG: 2 INJECTION INTRAMUSCULAR; INTRAVENOUS at 15:48

## 2020-07-30 NOTE — ED PROVIDER NOTES
EMERGENCY DEPARTMENT HISTORY AND PHYSICAL EXAM    Date: 7/30/2020  Patient Name: Linda Wilson    History of Presenting Illness     Chief Complaint   Patient presents with    Vomiting         History Provided By: Patient    Chief Complaint: vomiting    HPI(Context):   1:11 PM  Linda Wilson is a 54 y.o. female with PMHX of metabolic syndrome, CVA in 2018 with right sided residual weakness (walkes with cane) who presents to the emergency department C/O vomiting. Associated sxs include posterior headache. Sxs started on 5-6 days ago as patient was driving home from the store. Pt reports pain to back of neck that extended to posterior head. HA is 5/10. Worse with activity. Reports HA improved with tylenol but returned today. Reports she began to have nausea and vomiting today that prompted visit to ED. Pt has hx of CVA in 12/2018 in the left thalamus with right sided residual weakness. Pt ambulates with cane. Pt reports compliance with ASA and her chronic medication. Pt denies fever, chills, new N/W/T, diplopia, blurry vision, abdominal pain, and any other sxs or complaints. Pt takes 81 mg ASA    PCP: Kendal Rizo MD    Current Outpatient Medications   Medication Sig Dispense Refill    atorvastatin (LIPITOR) 40 mg tablet Take 40 mg by mouth daily.  amLODIPine (NORVASC) 10 mg tablet Take 10 mg by mouth daily.  hydroCHLOROthiazide (HYDRODIURIL) 25 mg tablet Take 25 mg by mouth daily.  metFORMIN (GLUCOPHAGE) 500 mg tablet Take 500 mg by mouth two (2) times a day.  lisinopriL (PRINIVIL, ZESTRIL) 20 mg tablet Take 20 mg by mouth daily.  gabapentin (NEURONTIN) 300 mg capsule Take 300 mg by mouth three (3) times daily.  metoprolol tartrate (LOPRESSOR) 50 mg tablet Take 50 mg by mouth two (2) times a day.  insulin glargine (LANTUS,BASAGLAR) 100 unit/mL (3 mL) inpn 10 Units by SubCUTAneous route daily.       glipiZIDE (GLUCOTROL) 10 mg tablet Take 1 Tab by mouth Before breakfast and dinner. 60 Tab 0    aspirin delayed-release 81 mg tablet Take 1 Tab by mouth daily. 30 Tab 3       Past History     Past Medical History:  Past Medical History:   Diagnosis Date    Diabetes ([address]     Hypercholesteremia     Hypertension     Stroke [facility])        Past Surgical History:  Past Surgical History:   Procedure Laterality Date    HX GYN      c section       Family History:  History reviewed. No pertinent family history. Social History:  Social History     Tobacco Use    Smoking status: Never Smoker    Smokeless tobacco: Never Used   Substance Use Topics    Alcohol use: Yes     Comment: socially    Drug use: No       Allergies:  No Known Allergies      Review of Systems   Review of Systems   Constitutional: Negative for chills and fever. Eyes: Negative for photophobia and visual disturbance. Respiratory: Negative for shortness of breath. Cardiovascular: Negative for chest pain. Gastrointestinal: Positive for nausea and vomiting. Musculoskeletal: Positive for neck pain (resolved). Neurological: Positive for dizziness, weakness (residual right sided weakness) and headaches. Hematological: Does not bruise/bleed easily. All other systems reviewed and are negative. Physical Exam     Vitals:    07/30/20 1326 07/30/20 1430 07/30/20 1530 07/30/20 1603   BP:  (!) 191/120 (!) 208/125 (!) 186/96   Pulse: (!) 116 (!) 117 (!) 121 (!) 113   Resp: 18 12 19    Temp:       SpO2: 100% 100% 100%    Weight:       Height:         Physical Exam  Vitals signs and nursing note reviewed. Constitutional:       General: She is not in acute distress. Appearance: She is well-developed. She is not diaphoretic. Comments: AA female in NAD. Alert. HENT:      Head: Normocephalic and atraumatic. Right Ear: External ear normal. No hemotympanum. Left Ear: External ear normal. No hemotympanum. Nose: Nose normal.      Mouth/Throat:      Mouth: No oral lesions.       Pharynx: Uvula midline. No oropharyngeal exudate, posterior oropharyngeal erythema or uvula swelling. Tonsils: No tonsillar abscesses. Eyes:      Extraocular Movements: Extraocular movements intact. Conjunctiva/sclera: Conjunctivae normal.      Pupils: Pupils are equal, round, and reactive to light. Neck:      Musculoskeletal: Normal range of motion and neck supple. Cardiovascular:      Rate and Rhythm: Normal rate and regular rhythm. Heart sounds: Normal heart sounds. No murmur. No friction rub. No gallop. Pulmonary:      Effort: Pulmonary effort is normal. No tachypnea, accessory muscle usage or respiratory distress. Breath sounds: Normal breath sounds. No decreased breath sounds, wheezing, rhonchi or rales. Abdominal:      General: There is no distension. Palpations: Abdomen is soft. Musculoskeletal: Normal range of motion. Skin:     General: Skin is warm and dry. Neurological:      Mental Status: She is alert and oriented to person, place, and time. Mental status is at baseline. GCS: GCS eye subscore is 4. GCS verbal subscore is 5. GCS motor subscore is 6. Cranial Nerves: Cranial nerves are intact. No cranial nerve deficit.       Comments: 4/5 strength to RUE and RLE   Psychiatric:         Judgment: Judgment normal.             Diagnostic Study Results     Labs -     Recent Results (from the past 12 hour(s))   EKG, 12 LEAD, INITIAL    Collection Time: 07/30/20  1:23 PM   Result Value Ref Range    Ventricular Rate 112 BPM    Atrial Rate 112 BPM    P-R Interval 156 ms    QRS Duration 86 ms    Q-T Interval 354 ms    QTC Calculation (Bezet) 483 ms    Calculated P Axis 67 degrees    Calculated R Axis -7 degrees    Calculated T Axis 41 degrees    Diagnosis       Sinus tachycardia  Possible Left atrial enlargement  Anterior infarct (cited on or before 21-DEC-2018)  Abnormal ECG  When compared with ECG of 21-DEC-2018 18:00,  No significant change was found     CBC WITH AUTOMATED DIFF Collection Time: 07/30/20  1:35 PM   Result Value Ref Range    WBC 9.1 4.6 - 13.2 K/uL    RBC 5.52 (H) 4.20 - 5.30 M/uL    HGB 12.6 12.0 - 16.0 g/dL    HCT 41.9 35.0 - 45.0 %    MCV 75.9 74.0 - 97.0 FL    MCH 22.8 (L) 24.0 - 34.0 PG    MCHC 30.1 (L) 31.0 - 37.0 g/dL    RDW 13.5 11.6 - 14.5 %    PLATELET 895 839 - 120 K/uL    MPV 11.0 9.2 - 11.8 FL    NEUTROPHILS 88 (H) 40 - 73 %    LYMPHOCYTES 10 (L) 21 - 52 %    MONOCYTES 2 (L) 3 - 10 %    EOSINOPHILS 0 0 - 5 %    BASOPHILS 0 0 - 2 %    ABS. NEUTROPHILS 8.0 1.8 - 8.0 K/UL    ABS. LYMPHOCYTES 0.9 0.9 - 3.6 K/UL    ABS. MONOCYTES 0.2 0.05 - 1.2 K/UL    ABS. EOSINOPHILS 0.0 0.0 - 0.4 K/UL    ABS. BASOPHILS 0.0 0.0 - 0.1 K/UL    DF AUTOMATED     METABOLIC PANEL, COMPREHENSIVE    Collection Time: 07/30/20  1:35 PM   Result Value Ref Range    Sodium 133 (L) 136 - 145 mmol/L    Potassium 3.6 3.5 - 5.5 mmol/L    Chloride 97 (L) 100 - 111 mmol/L    CO2 19 (L) 21 - 32 mmol/L    Anion gap 17 3.0 - 18 mmol/L    Glucose 333 (H) 74 - 99 mg/dL    BUN 14 7.0 - 18 MG/DL    Creatinine 1.04 0.6 - 1.3 MG/DL    BUN/Creatinine ratio 13 12 - 20      GFR est AA >60 >60 ml/min/1.73m2    GFR est non-AA 57 (L) >60 ml/min/1.73m2    Calcium 10.0 8.5 - 10.1 MG/DL    Bilirubin, total 0.9 0.2 - 1.0 MG/DL    ALT (SGPT) 14 13 - 56 U/L    AST (SGOT) 10 10 - 38 U/L    Alk.  phosphatase 130 (H) 45 - 117 U/L    Protein, total 8.5 (H) 6.4 - 8.2 g/dL    Albumin 3.8 3.4 - 5.0 g/dL    Globulin 4.7 (H) 2.0 - 4.0 g/dL    A-G Ratio 0.8 0.8 - 1.7     PROTHROMBIN TIME + INR    Collection Time: 07/30/20  1:35 PM   Result Value Ref Range    Prothrombin time 13.8 11.5 - 15.2 sec    INR 1.1 0.8 - 1.2     PTT    Collection Time: 07/30/20  1:35 PM   Result Value Ref Range    aPTT 26.7 23.0 - 36.4 SEC   MAGNESIUM    Collection Time: 07/30/20  1:35 PM   Result Value Ref Range    Magnesium 1.9 1.6 - 2.6 mg/dL   CARDIAC PANEL,(CK, CKMB & TROPONIN)    Collection Time: 07/30/20  1:35 PM   Result Value Ref Range    CK - MB <1.0 <3.6 ng/ml    CK-MB Index  0.0 - 4.0 %     CALCULATION NOT PERFORMED WHEN RESULT IS BELOW LINEAR LIMIT    CK 56 26 - 192 U/L    Troponin-I, QT <0.02 0.0 - 0.045 NG/ML   URINALYSIS W/ RFLX MICROSCOPIC    Collection Time: 07/30/20  1:40 PM   Result Value Ref Range    Color YELLOW      Appearance CLEAR      Specific gravity 1.028 1.005 - 1.030      pH (UA) 5.0 5.0 - 8.0      Protein 100 (A) NEG mg/dL    Glucose >1,000 (A) NEG mg/dL    Ketone 80 (A) NEG mg/dL    Bilirubin Negative NEG      Blood LARGE (A) NEG      Urobilinogen 1.0 0.2 - 1.0 EU/dL    Nitrites Negative NEG      Leukocyte Esterase TRACE (A) NEG     URINE MICROSCOPIC ONLY    Collection Time: 07/30/20  1:40 PM   Result Value Ref Range    WBC 0 to 2 0 - 5 /hpf    RBC 41 to 50 0 - 5 /hpf    Epithelial cells 2+ 0 - 5 /lpf    Bacteria FEW (A) NEG /hpf    Mucus FEW (A) NEG /lpf    Hyaline cast 0 to 3 0 - 2 /lpf    Granular cast 0 to 3 NEG /lpf   HCG URINE, QL. - POC    Collection Time: 07/30/20  1:47 PM   Result Value Ref Range    Pregnancy test,urine (POC) Negative NEG           CT HEAD WO CONT   Final Result   IMPRESSION:         1. Intraparenchymal hemorrhage likely centered within the anterior aspect of the   right thalamus with intraventricular extension into the right lateral ventricle,   third ventricle, and fourth ventricles. CRITICAL RESULT:  Intraparenchymal and intraventricular hemorrhage called to   JOSSIE Huggins in the ED prior to dictation at 3:15 PM on 7/30/2020      XR CHEST PORT   Final Result   IMPRESSION:      No acute radiographic cardiopulmonary abnormality. CT Results  (Last 48 hours)               07/30/20 1500  CT HEAD WO CONT Final result    Impression:  IMPRESSION:           1. Intraparenchymal hemorrhage likely centered within the anterior aspect of the   right thalamus with intraventricular extension into the right lateral ventricle,   third ventricle, and fourth ventricles.            CRITICAL RESULT: Intraparenchymal and intraventricular hemorrhage called to   JOSSIE Schwartz in the ED prior to dictation at 3:15 PM on 7/30/2020       Narrative:  EXAM: CT head       INDICATION: Headache with prior history of stroke. COMPARISON: MRI brain 12/21/2018; CTA head and neck 12/22/2018. TECHNIQUE: Axial CT imaging of the head was performed without intravenous   contrast. Standard multiplanar coronal and sagittal reformatted images were   obtained and are included in interpretation. One or more dose reduction techniques were used on this CT: automated exposure   control, adjustment of the mAs and/or kVp according to patient size, and   iterative reconstruction techniques. The specific techniques used on this CT   exam have been documented in the patient's electronic medical record. Digital   Imaging and Communications in Medicine (DICOM) format image data are available   to nonaffiliated external healthcare facilities or entities on a secure, media   free, reciprocally searchable basis with patient authorization for at least a   12-month period after this study. _______________       FINDINGS:       BRAIN AND POSTERIOR FOSSA: There is a focal area of intraparenchymal hemorrhage   present within the anterior aspect of the right thalamus. This hemorrhage   extends into the anterior aspect of the right lateral ventricle with additional   intraventricular extension into the third ventricle at the level of the foramen   of [place] as well as within the fourth ventricle. Overall size of this hemorrhage   is estimated at approximately 1.4 x 1.3 x 1.0 cm in size (estimated volume 0.9   mL). Ventricular configuration appears stable. There are patent basilar   cisterns. Gray-white matter differentiation is within normal limits. No evidence   of mass effect or midline shift. EXTRA-AXIAL SPACES AND MENINGES: No acute extra-axial fluid collection. CALVARIUM: Intact. SINUSES: Clear.        OTHER: None.       _______________               CXR Results  (Last 48 hours)               07/30/20 1349  XR CHEST PORT Final result    Impression:  IMPRESSION:       No acute radiographic cardiopulmonary abnormality. Narrative:  EXAM: XR CHEST PORT       CLINICAL INDICATION/HISTORY: Generalized malaise, fatigue   -Additional: None       COMPARISON: 12/21/2018       TECHNIQUE: Frontal view of the chest       _______________       FINDINGS:       HEART AND MEDIASTINUM: Normal cardiac size and mediastinal contours. LUNGS AND PLEURAL SPACES: No focal pneumonic consolidation, pneumothorax, or   pleural effusion. BONY THORAX AND SOFT TISSUES: No acute osseous abnormality       _______________                 Medications given in the ED-  Medications   sodium chloride 0.9 % bolus infusion 1,000 mL (0 mL IntraVENous IV Completed 7/30/20 1606)   ondansetron (ZOFRAN) injection 4 mg (4 mg IntraVENous Given 7/30/20 1409)   niCARdipine in Saline (CARDENE) 0.1mg/mL 200 ml premix infusion (5 mg/hr IntraVENous New Bag 7/30/20 1603)   levETIRAcetam in saline (iso-os) (KEPPRA) infusion 1,000 mg (0 mg IntraVENous IV Completed 7/30/20 1545)   morphine injection 4 mg (4 mg IntraVENous Given 7/30/20 1548)   ondansetron (ZOFRAN) injection 4 mg (4 mg IntraVENous Given 7/30/20 1548)         Medical Decision Making   I am the first provider for this patient. I reviewed the vital signs, available nursing notes, past medical history, past surgical history, family history and social history. Vital Signs-Reviewed the patient's vital signs. Pulse Oximetry Analysis - 100% on RA. NORMAL     Records Reviewed: Nursing Notes, Old Medical Records, Previous Radiology Studies and Previous Laboratory Studies    Provider Notes (Medical Decision Making): tension, cluster, sinusitis, SAH, pseudotumor, TA, CVA/TIA. Doubt meningitis. Procedures:  Procedures    ED Course:   1:11 PM Initial assessment performed.  The patients presenting problems have been discussed, and they are in agreement with the care plan formulated and outlined with them. I have encouraged them to ask questions as they arise throughout their visit. FACE-TO-FACE PROGRESS NOTE:  The patient presents with vomiting and headache. Patient reports that the headache started acutely on Friday, 5 days ago when the patient was driving with her  after picking up food. Denies any drug use, trauma. States that the headache started in the back of her head and radiated upwards. My exam shows nontoxic female who has no focal neurological deficits. She is moving all 4 extremities freely and symmetrically. However noted to be very hypertensive. Also tachycardic. CT scan showing intraparenchymal hemorrhage. Imp/plan: Patient given 1 g of prophylaxis Keppra. Started on Cardene drip for blood pressure control. Will require neurosurgical evaluation. I have personally seen and examined the patient, reviewed the JAYME's note and agree with findings and plan. Sourav Hooper DO    Diagnosis and Disposition       3:35 PM  Received call from Dr. Tavo Bartholomew (radiologist). Pt has intraparenchymal hemorrhage in the right thalalmus. Extends to right lateral ventricle and 3rd and 4th ventricle. Discussed with atteding. Will start titratable nifedipine infusion, IV keppra, and elevate head of bed to 30 degrees    4:04 PM Consult: I discussed care with Dr. Maxim Villanueva [facility] ED attending) It was a standard discussion, including history of patients chief complaint, available diagnostic results, and treatment course. Will accept patient ED to ED transfer for neurosurgical evaluation      1. Intracranial hemorrhage ([address]    2. Uncontrolled hypertension        PLAN:  1. Transfer to [facility] ED. Dr. Maxim Villanueva accepting    Attestations:   This note is prepared by Davonte Foster PA-C.  _______________________________      CRITICAL CARE NOTE:  3:38 PM  I have spent 48 minutes of critical care time involved in lab review, consultations with specialist, family decision-making, and documentation. During this entire length of time I was immediately available to the patient. Critical Care: The reason for providing this level of medical care for this critically ill patient was due a critical illness that impaired one or more vital organ systems such that there was a high probability of imminent or life threatening deterioration in the patients condition. This care involved high complexity decision making to assess, manipulate, and support vital system functions, to treat this degreee vital organ system failure and to prevent further life threatening deterioration of the patients condition. Please note that this dictation was completed with [employer], the [employer] voice recognition software. Quite often unanticipated grammatical, syntax, homophones, and other interpretive errors are inadvertently transcribed by the computer software. Please disregard these errors. Please excuse any errors that have escaped final proofreading.

## 2020-07-31 LAB
ATRIAL RATE: 112 BPM
CALCULATED P AXIS, ECG09: 67 DEGREES
CALCULATED R AXIS, ECG10: -7 DEGREES
CALCULATED T AXIS, ECG11: 41 DEGREES
DIAGNOSIS, 93000: NORMAL
P-R INTERVAL, ECG05: 156 MS
Q-T INTERVAL, ECG07: 354 MS
QRS DURATION, ECG06: 86 MS
QTC CALCULATION (BEZET), ECG08: 483 MS
VENTRICULAR RATE, ECG03: 112 BPM

## 2020-09-24 ENCOUNTER — HOSPITAL ENCOUNTER (EMERGENCY)
Age: 45
Discharge: HOME | End: 2020-09-24
Attending: EMERGENCY MEDICINE
Payer: MEDICAID

## 2020-09-24 ENCOUNTER — APPOINTMENT (OUTPATIENT)
Dept: GENERAL RADIOLOGY | Age: 45
End: 2020-09-24
Attending: EMERGENCY MEDICINE
Payer: MEDICAID

## 2020-09-24 ENCOUNTER — APPOINTMENT (OUTPATIENT)
Dept: CT IMAGING | Age: 45
End: 2020-09-24
Attending: EMERGENCY MEDICINE
Payer: MEDICAID

## 2020-09-24 VITALS
DIASTOLIC BLOOD PRESSURE: 91 MMHG | RESPIRATION RATE: 13 BRPM | TEMPERATURE: 98.3 F | HEART RATE: 94 BPM | HEIGHT: 61 IN | OXYGEN SATURATION: 95 % | WEIGHT: 140 LBS | SYSTOLIC BLOOD PRESSURE: 148 MMHG | BODY MASS INDEX: 26.43 KG/M2

## 2020-09-24 DIAGNOSIS — D25.9 UTERINE LEIOMYOMA, UNSPECIFIED LOCATION: ICD-10-CM

## 2020-09-24 DIAGNOSIS — I16.0 HYPERTENSIVE URGENCY: ICD-10-CM

## 2020-09-24 DIAGNOSIS — R73.9 HYPERGLYCEMIA: ICD-10-CM

## 2020-09-24 DIAGNOSIS — M54.32 LEFT SIDED SCIATICA: Primary | ICD-10-CM

## 2020-09-24 DIAGNOSIS — E87.6 HYPOKALEMIA: ICD-10-CM

## 2020-09-24 LAB
ALBUMIN SERPL-MCNC: 3.9 G/DL (ref 3.4–5)
ALBUMIN/GLOB SERPL: 0.9 {RATIO} (ref 0.8–1.7)
ALP SERPL-CCNC: 85 U/L (ref 45–117)
ALT SERPL-CCNC: 18 U/L (ref 13–56)
AMPHET UR QL SCN: NEGATIVE
ANION GAP SERPL CALC-SCNC: 9 MMOL/L (ref 3–18)
APPEARANCE UR: ABNORMAL
APTT PPP: 27.5 SEC (ref 23–36.4)
AST SERPL-CCNC: 9 U/L (ref 10–38)
BACTERIA URNS QL MICRO: ABNORMAL /HPF
BARBITURATES UR QL SCN: NEGATIVE
BASOPHILS # BLD: 0 K/UL (ref 0–0.1)
BASOPHILS NFR BLD: 0 % (ref 0–2)
BENZODIAZ UR QL: NEGATIVE
BILIRUB SERPL-MCNC: 0.7 MG/DL (ref 0.2–1)
BILIRUB UR QL: NEGATIVE
BUN SERPL-MCNC: 9 MG/DL (ref 7–18)
BUN/CREAT SERPL: 12 (ref 12–20)
CALCIUM SERPL-MCNC: 9 MG/DL (ref 8.5–10.1)
CANNABINOIDS UR QL SCN: NEGATIVE
CHLORIDE SERPL-SCNC: 99 MMOL/L (ref 100–111)
CK MB CFR SERPL CALC: NORMAL % (ref 0–4)
CK MB SERPL-MCNC: <1 NG/ML (ref 5–25)
CK SERPL-CCNC: 61 U/L (ref 26–192)
CO2 SERPL-SCNC: 28 MMOL/L (ref 21–32)
COCAINE UR QL SCN: NEGATIVE
COLOR UR: YELLOW
CREAT SERPL-MCNC: 0.73 MG/DL (ref 0.6–1.3)
DIFFERENTIAL METHOD BLD: ABNORMAL
EOSINOPHIL # BLD: 0 K/UL (ref 0–0.4)
EOSINOPHIL NFR BLD: 1 % (ref 0–5)
EPITH CASTS URNS QL MICRO: ABNORMAL /LPF (ref 0–5)
ERYTHROCYTE [DISTWIDTH] IN BLOOD BY AUTOMATED COUNT: 14.7 % (ref 11.6–14.5)
ETHANOL SERPL-MCNC: <3 MG/DL (ref 0–3)
GLOBULIN SER CALC-MCNC: 4.2 G/DL (ref 2–4)
GLUCOSE SERPL-MCNC: 295 MG/DL (ref 74–99)
GLUCOSE UR STRIP.AUTO-MCNC: >1000 MG/DL
HCG SERPL QL: NEGATIVE
HCT VFR BLD AUTO: 37 % (ref 35–45)
HDSCOM,HDSCOM: ABNORMAL
HGB BLD-MCNC: 11.9 G/DL (ref 12–16)
HGB UR QL STRIP: NEGATIVE
INR PPP: 1.1 (ref 0.8–1.2)
KETONES UR QL STRIP.AUTO: 15 MG/DL
LEUKOCYTE ESTERASE UR QL STRIP.AUTO: ABNORMAL
LIPASE SERPL-CCNC: 116 U/L (ref 73–393)
LYMPHOCYTES # BLD: 1.5 K/UL (ref 0.9–3.6)
LYMPHOCYTES NFR BLD: 33 % (ref 21–52)
MCH RBC QN AUTO: 23.6 PG (ref 24–34)
MCHC RBC AUTO-ENTMCNC: 32.2 G/DL (ref 31–37)
MCV RBC AUTO: 73.3 FL (ref 74–97)
METHADONE UR QL: NEGATIVE
MONOCYTES # BLD: 0.4 K/UL (ref 0.05–1.2)
MONOCYTES NFR BLD: 8 % (ref 3–10)
NEUTS SEG # BLD: 2.7 K/UL (ref 1.8–8)
NEUTS SEG NFR BLD: 58 % (ref 40–73)
NITRITE UR QL STRIP.AUTO: NEGATIVE
OPIATES UR QL: POSITIVE
PCP UR QL: NEGATIVE
PH UR STRIP: 5 [PH] (ref 5–8)
PLATELET # BLD AUTO: 295 K/UL (ref 135–420)
PMV BLD AUTO: 10.7 FL (ref 9.2–11.8)
POTASSIUM SERPL-SCNC: 3.2 MMOL/L (ref 3.5–5.5)
PROT SERPL-MCNC: 8.1 G/DL (ref 6.4–8.2)
PROT UR STRIP-MCNC: ABNORMAL MG/DL
PROTHROMBIN TIME: 13.6 SEC (ref 11.5–15.2)
RBC # BLD AUTO: 5.05 M/UL (ref 4.2–5.3)
RBC #/AREA URNS HPF: ABNORMAL /HPF (ref 0–5)
RBC MORPH BLD: ABNORMAL
SODIUM SERPL-SCNC: 136 MMOL/L (ref 136–145)
SP GR UR REFRACTOMETRY: >1.03 (ref 1–1.03)
TROPONIN I SERPL-MCNC: <0.02 NG/ML (ref 0–0.04)
UROBILINOGEN UR QL STRIP.AUTO: 1 EU/DL (ref 0.2–1)
WBC # BLD AUTO: 4.6 K/UL (ref 4.6–13.2)
WBC URNS QL MICRO: ABNORMAL /HPF (ref 0–5)

## 2020-09-24 PROCEDURE — 74011000636 HC RX REV CODE- 636: Performed by: EMERGENCY MEDICINE

## 2020-09-24 PROCEDURE — 81001 URINALYSIS AUTO W/SCOPE: CPT

## 2020-09-24 PROCEDURE — 84703 CHORIONIC GONADOTROPIN ASSAY: CPT

## 2020-09-24 PROCEDURE — 85610 PROTHROMBIN TIME: CPT

## 2020-09-24 PROCEDURE — 85730 THROMBOPLASTIN TIME PARTIAL: CPT

## 2020-09-24 PROCEDURE — 85025 COMPLETE CBC W/AUTO DIFF WBC: CPT

## 2020-09-24 PROCEDURE — 83690 ASSAY OF LIPASE: CPT

## 2020-09-24 PROCEDURE — 80053 COMPREHEN METABOLIC PANEL: CPT

## 2020-09-24 PROCEDURE — 71275 CT ANGIOGRAPHY CHEST: CPT

## 2020-09-24 PROCEDURE — 99285 EMERGENCY DEPT VISIT HI MDM: CPT

## 2020-09-24 PROCEDURE — 96374 THER/PROPH/DIAG INJ IV PUSH: CPT

## 2020-09-24 PROCEDURE — 74011250636 HC RX REV CODE- 250/636: Performed by: EMERGENCY MEDICINE

## 2020-09-24 PROCEDURE — 93005 ELECTROCARDIOGRAM TRACING: CPT

## 2020-09-24 PROCEDURE — 71045 X-RAY EXAM CHEST 1 VIEW: CPT

## 2020-09-24 PROCEDURE — 80307 DRUG TEST PRSMV CHEM ANLYZR: CPT

## 2020-09-24 PROCEDURE — 82550 ASSAY OF CK (CPK): CPT

## 2020-09-24 PROCEDURE — 96375 TX/PRO/DX INJ NEW DRUG ADDON: CPT

## 2020-09-24 PROCEDURE — 96376 TX/PRO/DX INJ SAME DRUG ADON: CPT

## 2020-09-24 RX ORDER — MORPHINE SULFATE 10 MG/ML
6 INJECTION, SOLUTION INTRAMUSCULAR; INTRAVENOUS
Status: COMPLETED | OUTPATIENT
Start: 2020-09-24 | End: 2020-09-24

## 2020-09-24 RX ORDER — HYDROCODONE BITARTRATE AND ACETAMINOPHEN 5; 325 MG/1; MG/1
1 TABLET ORAL
Qty: 12 TAB | Refills: 0 | Status: SHIPPED | OUTPATIENT
Start: 2020-09-24 | End: 2020-09-27

## 2020-09-24 RX ORDER — LIDOCAINE 50 MG/G
PATCH TOPICAL
Qty: 15 EACH | Refills: 0 | Status: SHIPPED | OUTPATIENT
Start: 2020-09-24

## 2020-09-24 RX ORDER — LABETALOL HCL 20 MG/4 ML
40 SYRINGE (ML) INTRAVENOUS
Status: COMPLETED | OUTPATIENT
Start: 2020-09-24 | End: 2020-09-24

## 2020-09-24 RX ORDER — LABETALOL HCL 20 MG/4 ML
20 SYRINGE (ML) INTRAVENOUS
Status: COMPLETED | OUTPATIENT
Start: 2020-09-24 | End: 2020-09-24

## 2020-09-24 RX ADMIN — LABETALOL HYDROCHLORIDE 40 MG: 5 INJECTION, SOLUTION INTRAVENOUS at 06:13

## 2020-09-24 RX ADMIN — MORPHINE SULFATE 6 MG: 10 INJECTION INTRAVENOUS at 05:29

## 2020-09-24 RX ADMIN — IOPAMIDOL 100 ML: 755 INJECTION, SOLUTION INTRAVENOUS at 05:52

## 2020-09-24 RX ADMIN — LABETALOL HYDROCHLORIDE 20 MG: 5 INJECTION, SOLUTION INTRAVENOUS at 05:00

## 2020-09-24 NOTE — ED PROVIDER NOTES
EMERGENCY DEPARTMENT HISTORY AND PHYSICAL EXAM    Date: 9/24/2020  Patient Name: Olivia Alfonso    History of Presenting Illness     Chief Complaint   Patient presents with    Back Pain         History Provided By: Patient    4:19 AM  Olivia Alfonso is a 54 y.o. female with PMHX of diabetes, hypertension, spontaneous intracranial hemorrhage, CVA who presents to the emergency department C/O back pain. Per patient she started having left-sided back pain 4 days ago that has progressively worsened. It is worse with movement. No clear relieving factors. She states she has residual right-sided deficits since her stroke that are unchanged and denies any new deficits on the left side. She denies any falls or injuries. No fever, cough, chest pain, shortness breath, abdominal pain, bowel or urinary complaints, sick contacts, recent travel. She does not use any substances or take any blood thinners. PCP: Angela Coffey MD    Current Outpatient Medications   Medication Sig Dispense Refill    lidocaine (Lidoderm) 5 % Apply patch to the affected area for 12 hours a day and remove for 12 hours a day. 15 Each 0    HYDROcodone-acetaminophen (Norco) 5-325 mg per tablet Take 1 Tab by mouth every six (6) hours as needed for Pain for up to 3 days. Max Daily Amount: 4 Tabs. 12 Tab 0    atorvastatin (LIPITOR) 40 mg tablet Take 40 mg by mouth daily.  amLODIPine (NORVASC) 10 mg tablet Take 10 mg by mouth daily.  hydroCHLOROthiazide (HYDRODIURIL) 25 mg tablet Take 25 mg by mouth daily.  metFORMIN (GLUCOPHAGE) 500 mg tablet Take 500 mg by mouth two (2) times a day.  lisinopriL (PRINIVIL, ZESTRIL) 20 mg tablet Take 20 mg by mouth daily.  gabapentin (NEURONTIN) 300 mg capsule Take 300 mg by mouth three (3) times daily.  metoprolol tartrate (LOPRESSOR) 50 mg tablet Take 50 mg by mouth two (2) times a day.       insulin glargine (LANTUS,BASAGLAR) 100 unit/mL (3 mL) inpn 10 Units by SubCUTAneous route daily.      glipiZIDE (GLUCOTROL) 10 mg tablet Take 1 Tab by mouth Before breakfast and dinner. 60 Tab 0    aspirin delayed-release 81 mg tablet Take 1 Tab by mouth daily. 30 Tab 3       Past History     Past Medical History:  Past Medical History:   Diagnosis Date    Diabetes ([address]     Hypercholesteremia     Hypertension     Stroke [facility])        Past Surgical History:  Past Surgical History:   Procedure Laterality Date    HX GYN      c section       Family History:  History reviewed. No pertinent family history. Social History:  Social History     Tobacco Use    Smoking status: Never Smoker    Smokeless tobacco: Never Used   Substance Use Topics    Alcohol use: Yes     Comment: socially    Drug use: No       Allergies:  No Known Allergies      Review of Systems   Review of Systems   Constitutional: Negative for fever. Respiratory: Negative for shortness of breath. Cardiovascular: Negative for chest pain. Gastrointestinal: Negative for abdominal pain. Musculoskeletal: Positive for back pain. All other systems reviewed and are negative.         Physical Exam     Vitals:    09/24/20 0604 09/24/20 0610 09/24/20 0613 09/24/20 0620   BP: (!) 183/101 (!) 174/103 (!) 174/103 (!) 150/93   Pulse: (!) 102 (!) 101 100 98   Resp: 20 17  15   Temp:       SpO2: 97% 93%  94%   Weight:       Height:         Physical Exam    Nursing notes and vital signs reviewed    Constitutional: Non toxic appearing, moderate distress  Head: Normocephalic, Atraumatic  Eyes: EOMI  Neck: Supple  Cardiovascular: Tachycardic and regular rhythm, no murmurs, rubs, or gallops  Chest: Normal work of breathing and chest excursion bilaterally  Lungs: Clear to ausculation bilaterally  Abdomen: Soft, non tender, non distended  Back: No evidence of trauma or deformity, no spinal tenderness to palpation, left flank tenderness to palpation, no overlying skin changes  Extremities: No evidence of trauma or deformity, no LE edema, distal neurovascular intact  Skin: Warm and dry, normal cap refill  Neuro: Alert and appropriate, normal speech, baseline weakness on the right side, ambulates with cane  Psychiatric: Normal mood and affect      Diagnostic Study Results     Labs -     Recent Results (from the past 12 hour(s))   CBC WITH AUTOMATED DIFF    Collection Time: 09/24/20  4:37 AM   Result Value Ref Range    WBC 4.6 4.6 - 13.2 K/uL    RBC 5.05 4.20 - 5.30 M/uL    HGB 11.9 (L) 12.0 - 16.0 g/dL    HCT 37.0 35.0 - 45.0 %    MCV 73.3 (L) 74.0 - 97.0 FL    MCH 23.6 (L) 24.0 - 34.0 PG    MCHC 32.2 31.0 - 37.0 g/dL    RDW 14.7 (H) 11.6 - 14.5 %    PLATELET 621 974 - 731 K/uL    MPV 10.7 9.2 - 11.8 FL    NEUTROPHILS 58 40 - 73 %    LYMPHOCYTES 33 21 - 52 %    MONOCYTES 8 3 - 10 %    EOSINOPHILS 1 0 - 5 %    BASOPHILS 0 0 - 2 %    ABS. NEUTROPHILS 2.7 1.8 - 8.0 K/UL    ABS. LYMPHOCYTES 1.5 0.9 - 3.6 K/UL    ABS. MONOCYTES 0.4 0.05 - 1.2 K/UL    ABS. EOSINOPHILS 0.0 0.0 - 0.4 K/UL    ABS. BASOPHILS 0.0 0.0 - 0.1 K/UL    RBC COMMENTS ANISOCYTOSIS  1+        RBC COMMENTS POIKILOCYTOSIS  1+        RBC COMMENTS TARGET CELLS  OCCASIONAL  OVALOCYTES  1+        RBC COMMENTS SCHISTOCYTES  FEW        DF AUTOMATED     METABOLIC PANEL, COMPREHENSIVE    Collection Time: 09/24/20  4:37 AM   Result Value Ref Range    Sodium 136 136 - 145 mmol/L    Potassium 3.2 (L) 3.5 - 5.5 mmol/L    Chloride 99 (L) 100 - 111 mmol/L    CO2 28 21 - 32 mmol/L    Anion gap 9 3.0 - 18 mmol/L    Glucose 295 (H) 74 - 99 mg/dL    BUN 9 7.0 - 18 MG/DL    Creatinine 0.73 0.6 - 1.3 MG/DL    BUN/Creatinine ratio 12 12 - 20      GFR est AA >60 >60 ml/min/1.73m2    GFR est non-AA >60 >60 ml/min/1.73m2    Calcium 9.0 8.5 - 10.1 MG/DL    Bilirubin, total 0.7 0.2 - 1.0 MG/DL    ALT (SGPT) 18 13 - 56 U/L    AST (SGOT) 9 (L) 10 - 38 U/L    Alk.  phosphatase 85 45 - 117 U/L    Protein, total 8.1 6.4 - 8.2 g/dL    Albumin 3.9 3.4 - 5.0 g/dL    Globulin 4.2 (H) 2.0 - 4.0 g/dL    A-G Ratio 0.9 0.8 - 1.7 PROTHROMBIN TIME + INR    Collection Time: 09/24/20  4:37 AM   Result Value Ref Range    Prothrombin time 13.6 11.5 - 15.2 sec    INR 1.1 0.8 - 1.2     PTT    Collection Time: 09/24/20  4:37 AM   Result Value Ref Range    aPTT 27.5 23.0 - 36.4 SEC   HCG QL SERUM    Collection Time: 09/24/20  4:37 AM   Result Value Ref Range    HCG, Ql. Negative NEG     CARDIAC PANEL,(CK, CKMB & TROPONIN)    Collection Time: 09/24/20  4:37 AM   Result Value Ref Range    CK - MB <1.0 <3.6 ng/ml    CK-MB Index  0.0 - 4.0 %     CALCULATION NOT PERFORMED WHEN RESULT IS BELOW LINEAR LIMIT    CK 61 26 - 192 U/L    Troponin-I, QT <0.02 0.0 - 0.045 NG/ML   ETHYL ALCOHOL    Collection Time: 09/24/20  4:37 AM   Result Value Ref Range    ALCOHOL(ETHYL),SERUM <3 0 - 3 MG/DL   LIPASE    Collection Time: 09/24/20  4:37 AM   Result Value Ref Range    Lipase 116 73 - 393 U/L   URINALYSIS W/ RFLX MICROSCOPIC    Collection Time: 09/24/20  7:15 AM   Result Value Ref Range    Color YELLOW      Appearance CLOUDY      Specific gravity >1.030 (H) 1.005 - 1.030    pH (UA) 5.0 5.0 - 8.0      Protein TRACE (A) NEG mg/dL    Glucose >1,000 (A) NEG mg/dL    Ketone 15 (A) NEG mg/dL    Bilirubin Negative NEG      Blood Negative NEG      Urobilinogen 1.0 0.2 - 1.0 EU/dL    Nitrites Negative NEG      Leukocyte Esterase SMALL (A) NEG     DRUG SCREEN, URINE    Collection Time: 09/24/20  7:15 AM   Result Value Ref Range    BENZODIAZEPINES Negative NEG      BARBITURATES Negative NEG      THC (TH-CANNABINOL) Negative NEG      OPIATES Positive (A) NEG      PCP(PHENCYCLIDINE) Negative NEG      COCAINE Negative NEG      AMPHETAMINES Negative NEG      METHADONE Negative NEG      HDSCOM (NOTE)    URINE MICROSCOPIC ONLY    Collection Time: 09/24/20  7:15 AM   Result Value Ref Range    WBC 1 to 4 0 - 5 /hpf    RBC NONE 0 - 5 /hpf    Epithelial cells 4+ 0 - 5 /lpf    Bacteria 1+ (A) NEG /hpf       Radiologic Studies -   CTA CHEST ABD PELV W CONT   Final Result   IMPRESSION: 1. No aortic aneurysm or dissection identified. No pulmonary embolism is seen. No other acute or inflammatory abnormalities identified in the chest, abdomen,   or pelvis. 2. Moderate colonic stool burden with no bowel obstruction. 3. Fibroid uterus. XR CHEST PORT   Final Result   IMPRESSION:      No active cardiopulmonary disease. CT Results  (Last 48 hours)               09/24/20 0607  CTA CHEST ABD PELV W CONT Final result    Impression:  IMPRESSION:       1. No aortic aneurysm or dissection identified. No pulmonary embolism is seen. No other acute or inflammatory abnormalities identified in the chest, abdomen,   or pelvis. 2. Moderate colonic stool burden with no bowel obstruction. 3. Fibroid uterus. Narrative:  EXAM: CTA of the chest, abdomen, and pelvis       CLINICAL INDICATION/HISTORY: Chest and abdominal pain       COMPARISON: 12/22/2018       TECHNIQUE: Axial CT imaging of the chest, abdomen, and pelvis was performed with   and without intravenous contrast. Maximum intensity projections were generated. One or more dose reduction techniques were used on this CT: automated exposure   control, adjustment of the mAs and/or kVp according to patient size, and   iterative reconstruction techniques. The specific techniques used on this CT   exam have been documented in the patient's electronic medical record. Digital   Imaging and Communications in Medicine (DICOM) format image data are available   to nonaffiliated external healthcare facilities or entities on a secured, media   free, reciprocally searchable basis with patient authorization for at least a   12-month period after this study. _______________       FINDINGS:       VASCULAR: No aortic aneurysm or dissection identified. Great vessels are patent. Visceral vessels are patent. No pulmonary embolism identified.       ===============       CHEST:       LUNGS: No focal consolidation. AIRWAY: Normal.       PLEURA: Normal, with no effusion or pneumothorax. MEDIASTINUM: Normal heart size. No pericardial effusion. LYMPH NODES: No enlarged lymph nodes. OTHER: None.       ===============       ABDOMEN/PELVIS:       LIVER, BILIARY: Liver is unremarkable. No biliary dilation. Gallbladder is   unremarkable. PANCREAS: Unremarkable. SPLEEN: Unremarkable. ADRENALS: Unremarkable. KIDNEYS/URETERS/BLADDER: Unremarkable. PELVIC ORGANS: Fibroid uterus. LYMPH NODES: No enlarged lymph nodes. GASTROINTESTINAL TRACT: Moderate colonic stool burden. No bowel obstruction. The   appendix is normal.       BONES: No acute or aggressive osseous abnormalities identified. OTHER: None.       _______________               CXR Results  (Last 48 hours)               09/24/20 0457  XR CHEST PORT Final result    Impression:  IMPRESSION:       No active cardiopulmonary disease. Narrative:  EXAM: CHEST RADIOGRAPH       CLINICAL INDICATION/HISTORY: Hypertension     > Additional: None       COMPARISON: 7/30/2020       TECHNIQUE: Portable frontal view of the chest       _______________       FINDINGS:       SUPPORT DEVICES: None. HEART AND MEDIASTINUM: Heart size is normal.       LUNGS AND PLEURAL SPACES: No focal consolidation, effusion, or pneumothorax. BONES AND SOFT TISSUES: Unremarkable.       _______________                 Medications given in the ED-  Medications   labetaloL (NORMODYNE;TRANDATE) 20 mg/4 mL (5 mg/mL) injection 20 mg (20 mg IntraVENous Given 9/24/20 0500)   morphine 10 mg/ml injection 6 mg (6 mg IntraVENous Given 9/24/20 0529)   iopamidoL (ISOVUE-370) 76 % injection  mL (100 mL IntraVENous Given 9/24/20 0514)   labetaloL (NORMODYNE;TRANDATE) 20 mg/4 mL (5 mg/mL) injection 40 mg (40 mg IntraVENous Given 9/24/20 0934)         Medical Decision Making   I am the first provider for this patient.     I reviewed the vital signs, available nursing notes, past medical history, past surgical history, family history and social history. Vital Signs-Reviewed the patient's vital signs. Pulse Oximetry Analysis -100% on room air, not hypoxic    Cardiac Monitor:  Rate: 120 bpm  Rhythm: Sinus tachycardia    EKG interpretation: (Preliminary)  EKG read by Dr. Tawana Diaz at 4:26 AM  Sinus tachycardia at a rate of 120 bpm, CA interval of 152 ms, QRS duration of 88 ms    Records Reviewed: Nursing Notes, Old Medical Records and Previous electrocardiograms    Provider Notes (Medical Decision Making): Min Russ is a 54 y.o. female presenting with left-sided back pain and found to be extremely hypertensive on arrival.  Labs and imaging were obtained for concern for aortic dissection versus aneurysm due to elevated blood pressure and back pain. No acute abnormality identified on CT. Patient's blood pressure improved with IV labetalol and IV morphine here. She also reports substantial improvement in her back pain. Neurologic status is at her baseline post her prior strokes. Suspect that this is likely sciatica in nature possibly due to her cane usage and her right-sided deficits. Plan for discharge with symptom management and referral to orthopedic spine specialist for follow-up with return precautions. Patient understands and agrees with this plan. Procedures:  Procedures    ED Course:   6:40 AM  Updated patient on all results and plan. All questions answered. 7:00 AM  Patient's presentation, labs/imaging and plan of care was reviewed with Dr. Mau Franco as part of sign out. They will UA as part of the plan discussed with the patient. Dr. Ed Hewitt assistance in completion of this plan is greatly appreciated but it should be noted that I will be the provider of record for this patient. Written by Dr. Tawana Diaz      7:58 AM  UA normal.  Counseled patient and will discharge with treatment plan for sciatica.   Advised close follow-up with PCP due to poorly controlled blood pressure and blood glucose. Diagnosis and Disposition     Critical Care: 6:40 AM  I have spent 36 minutes of critical care time involved in lab review, consultations with specialist, family decision-making, and documentation. During this entire length of time I was immediately available to the patient. Critical Care: The reason for providing this level of medical care for this critically ill patient was due a critical illness that impaired one or more vital organ systems such that there was a high probability of imminent or life threatening deterioration in the patients condition. This care involved high complexity decision making to assess, manipulate, and support vital system functions, to treat this degreee vital organ system failure and to prevent further life threatening deterioration of the patients condition. DISCHARGE NOTE:    Katie Durham's  results have been reviewed with her. She has been counseled regarding her diagnosis, treatment, and plan. She verbally conveys understanding and agreement of the signs, symptoms, diagnosis, treatment and prognosis and additionally agrees to follow up as discussed. She also agrees with the care-plan and conveys that all of her questions have been answered. I have also provided discharge instructions for her that include: educational information regarding their diagnosis and treatment, and list of reasons why they would want to return to the ED prior to their follow-up appointment, should her condition change. She has been provided with education for proper emergency department utilization. CLINICAL IMPRESSION:    1. Left sided sciatica    2. Uterine leiomyoma, unspecified location    3. Hypertensive urgency    4. Hyperglycemia    5. Hypokalemia        PLAN:  1. D/C Home  2.    Current Discharge Medication List      START taking these medications    Details   lidocaine (Lidoderm) 5 % Apply patch to the affected area for 12 hours a day and remove for 12 hours a day. Qty: 15 Each, Refills: 0      HYDROcodone-acetaminophen (Norco) 5-325 mg per tablet Take 1 Tab by mouth every six (6) hours as needed for Pain for up to 3 days. Max Daily Amount: 4 Tabs. Qty: 12 Tab, Refills: 0    Associated Diagnoses: Left sided sciatica         CONTINUE these medications which have NOT CHANGED    Details   atorvastatin (LIPITOR) 40 mg tablet Take 40 mg by mouth daily. amLODIPine (NORVASC) 10 mg tablet Take 10 mg by mouth daily. hydroCHLOROthiazide (HYDRODIURIL) 25 mg tablet Take 25 mg by mouth daily. metFORMIN (GLUCOPHAGE) 500 mg tablet Take 500 mg by mouth two (2) times a day. lisinopriL (PRINIVIL, ZESTRIL) 20 mg tablet Take 20 mg by mouth daily. gabapentin (NEURONTIN) 300 mg capsule Take 300 mg by mouth three (3) times daily. metoprolol tartrate (LOPRESSOR) 50 mg tablet Take 50 mg by mouth two (2) times a day. insulin glargine (LANTUS,BASAGLAR) 100 unit/mL (3 mL) inpn 10 Units by SubCUTAneous route daily. glipiZIDE (GLUCOTROL) 10 mg tablet Take 1 Tab by mouth Before breakfast and dinner. Qty: 60 Tab, Refills: 0      aspirin delayed-release 81 mg tablet Take 1 Tab by mouth daily. Qty: 30 Tab, Refills: 3           3. Follow-up Information     Follow up With Specialties Details Why Uriel Mobley MD Internal Medicine Schedule an appointment as soon as possible for a visit  [address] [place] [address]      Gwen Esparza MD Orthopedic Surgery Schedule an appointment as soon as possible for a visit  [address] [place] [address]  [phone]      [facility] EMERGENCY DEPT Emergency Medicine  If symptoms worsen [address] [id number]  [phone]        _______________________________      Please note that this dictation was completed with Cardback, the computer voice recognition software.   Quite often unanticipated grammatical, syntax, homophones, and other interpretive errors are inadvertently transcribed by the computer software. Please disregard these errors. Please excuse any errors that have escaped final proofreading.

## 2020-09-24 NOTE — ED NOTES
Pt unable to provide urine specimen. Dr Charles Fuller notified. Blood bank called requesting additional blood for type and screen and noting that the blood would be a send out. Dr Charles Fuller notified and cancelled type and screen. Blood bank notified.

## 2020-09-24 NOTE — DISCHARGE INSTRUCTIONS
Patient Education        Sciatica: Care Instructions  Your Care Instructions     Sciatica (say \"ugu-XV-jw-kuh\") is an irritation of one of the sciatic nerves, which come from the spinal cord in the lower back. The sciatic nerves and their branches extend down through the buttock to the foot. Sciatica can develop when an injured disc in the back irritates or presses against a spinal nerve root. Its main symptom is pain, numbness, or weakness that is often worse in the leg or foot than in the back. Sciatica often will improve and go away with time. Early treatment usually includes medicines and exercises to relieve pain. Follow-up care is a key part of your treatment and safety. Be sure to make and go to all appointments, and call your doctor if you are having problems. It's also a good idea to know your test results and keep a list of the medicines you take. How can you care for yourself at home? · Take pain medicines exactly as directed. ? If the doctor gave you a prescription medicine for pain, take it as prescribed. ? If you are not taking a prescription pain medicine, ask your doctor if you can take an over-the-counter medicine. · Use heat or ice to relieve pain. ? To apply heat, put a warm water bottle, heating pad set on low, or warm cloth on your back. Do not go to sleep with a heating pad on your skin. ? To use ice, put ice or a cold pack on the area for 10 to 20 minutes at a time. Put a thin cloth between the ice and your skin. · Avoid sitting if possible, unless it feels better than standing. · Alternate lying down with short walks. Increase your walking distance as you are able to without making your symptoms worse. · Do not do anything that makes your symptoms worse. When should you call for help? Call 911 anytime you think you may need emergency care. For example, call if:    · You are unable to move a leg at all.    Call your doctor now or seek immediate medical care if:    · You have new or worse symptoms in your legs or buttocks. Symptoms may include:  ? Numbness or tingling. ? Weakness. ? Pain.     · You lose bladder or bowel control. Watch closely for changes in your health, and be sure to contact your doctor if:    · You are not getting better as expected. Where can you learn more? Go to http://kolby-alpa.info/  Enter Z239 in the search box to learn more about \"Sciatica: Care Instructions. \"  Current as of: March 2, 2020               Content Version: 12.6  © 7794-0118 [employer]. Care instructions adapted under license by [employer] (which disclaims liability or warranty for this information). If you have questions about a medical condition or this instruction, always ask your healthcare professional. [address] any warranty or liability for your use of this information. Patient Education        Uterine Fibroids: Care Instructions  Your Care Instructions     Uterine fibroids are growths in the uterus. Fibroids aren't cancer. Doctors don't know what causes fibroids. Fibroids are very common in women during their childbearing years. Fibroids can grow on the inside of the uterus, in the muscle wall of the uterus, or near the outside wall of the uterus. In some women, fibroids cause painful cramps and heavy periods. In these cases, taking anti-inflammatory medicines, birth control pills, or using an intrauterine device (IUD) often helps decrease symptoms. Sometimes surgery is needed to treat fibroids. But if you are near menopause, you may want to wait and see if your symptoms get better. Most fibroids shrink and go away after menopause, when your menstrual periods stop completely. Follow-up care is a key part of your treatment and safety. Be sure to make and go to all appointments, and call your doctor if you are having problems.  It's also a good idea to know your test results and keep a list of the medicines you take. How can you care for yourself at home? · If your doctor gave you medicine, take it as exactly as prescribed. Be safe with medicines. Call your doctor if you think you are having a problem with your medicine. · Take anti-inflammatory medicines for pain. These include ibuprofen (Advil, Motrin) and naproxen (Aleve). Read and follow all instructions on the label. · Use heat, such as a hot water bottle or a heating pad set on low, or a warm bath to relax tense muscles and relieve cramping. Put a thin cloth between the heating pad and your skin. Never go to sleep with a heating pad on. · Lie down and put a pillow under your knees. Or, lie on your side and bring your knees up to your chest. These positions may help relieve belly pain or pressure. · Keep track of how many sanitary pads or tampons you use each day. · Get at least 30 minutes of exercise on most days of the week. Walking is a good choice. You also may want to do other activities, such as running, swimming, cycling, or playing tennis or team sports. · If you bleed longer than usual or have heavy bleeding, take a daily multivitamin with iron. When should you call for help? Call your doctor now or seek immediate medical care if:    · You have severe vaginal bleeding.     · You have new or worse belly or pelvic pain. Watch closely for changes in your health, and be sure to contact your doctor if:    · You have unusual vaginal bleeding.     · You do not get better as expected. Where can you learn more? Go to http://kolby-alpa.info/  Enter B121 in the search box to learn more about \"Uterine Fibroids: Care Instructions. \"  Current as of: November 8, 2019               Content Version: 12.6  © 2761-4612 [employer]. Care instructions adapted under license by [employer] (which disclaims liability or warranty for this information).  If you have questions about a medical condition or this instruction, always ask your healthcare professional. [address] any warranty or liability for your use of this information. Patient Education        High Blood Pressure: Care Instructions  Overview     It's normal for blood pressure to go up and down throughout the day. But if it stays up, you have high blood pressure. Another name for high blood pressure is hypertension. Despite what a lot of people think, high blood pressure usually doesn't cause headaches or make you feel dizzy or lightheaded. It usually has no symptoms. But it does increase your risk of stroke, heart attack, and other problems. You and your doctor will talk about your risks of these problems based on your blood pressure. Your doctor will give you a goal for your blood pressure. Your goal will be based on your health and your age. Lifestyle changes, such as eating healthy and being active, are always important to help lower blood pressure. You might also take medicine to reach your blood pressure goal.  Follow-up care is a key part of your treatment and safety. Be sure to make and go to all appointments, and call your doctor if you are having problems. It's also a good idea to know your test results and keep a list of the medicines you take. How can you care for yourself at home? Medical treatment  · If you stop taking your medicine, your blood pressure will go back up. You may take one or more types of medicine to lower your blood pressure. Be safe with medicines. Take your medicine exactly as prescribed. Call your doctor if you think you are having a problem with your medicine. · Talk to your doctor before you start taking aspirin every day. Aspirin can help certain people lower their risk of a heart attack or stroke. But taking aspirin isn't right for everyone, because it can cause serious bleeding. · See your doctor regularly.  You may need to see the doctor more often at first or until your blood pressure comes down. · If you are taking blood pressure medicine, talk to your doctor before you take decongestants or anti-inflammatory medicine, such as ibuprofen. Some of these medicines can raise blood pressure. · Learn how to check your blood pressure at home. Lifestyle changes  · Stay at a healthy weight. This is especially important if you put on weight around the waist. Losing even 10 pounds can help you lower your blood pressure. · If your doctor recommends it, get more exercise. Walking is a good choice. Bit by bit, increase the amount you walk every day. Try for at least 30 minutes on most days of the week. You also may want to swim, bike, or do other activities. · Avoid or limit alcohol. Talk to your doctor about whether you can drink any alcohol. · Try to limit how much sodium you eat to less than 2,300 milligrams (mg) a day. Your doctor may ask you to try to eat less than 1,500 mg a day. · Eat plenty of fruits (such as bananas and oranges), vegetables, legumes, whole grains, and low-fat dairy products. · Lower the amount of saturated fat in your diet. Saturated fat is found in animal products such as milk, cheese, and meat. Limiting these foods may help you lose weight and also lower your risk for heart disease. · Do not smoke. Smoking increases your risk for heart attack and stroke. If you need help quitting, talk to your doctor about stop-smoking programs and medicines. These can increase your chances of quitting for good. When should you call for help? Call  911 anytime you think you may need emergency care. This may mean having symptoms that suggest that your blood pressure is causing a serious heart or blood vessel problem. Your blood pressure may be over 180/120. For example, call 911 if:    · You have symptoms of a heart attack. These may include:  ? Chest pain or pressure, or a strange feeling in the chest.  ? Sweating. ? Shortness of breath. ? Nausea or vomiting.   ? Pain, pressure, or a strange feeling in the back, neck, jaw, or upper belly or in one or both shoulders or arms. ? Lightheadedness or sudden weakness. ? A fast or irregular heartbeat.     · You have symptoms of a stroke. These may include:  ? Sudden numbness, tingling, weakness, or loss of movement in your face, arm, or leg, especially on only one side of your body. ? Sudden vision changes. ? Sudden trouble speaking. ? Sudden confusion or trouble understanding simple statements. ? Sudden problems with walking or balance. ? A sudden, severe headache that is different from past headaches.     · You have severe back or belly pain. Do not wait until your blood pressure comes down on its own. Get help right away. Call your doctor now or seek immediate care if:    · Your blood pressure is much higher than normal (such as 180/120 or higher), but you don't have symptoms.     · You think high blood pressure is causing symptoms, such as:  ? Severe headache.  ? Blurry vision. Watch closely for changes in your health, and be sure to contact your doctor if:    · Your blood pressure measures higher than your doctor recommends at least 2 times. That means the top number is higher or the bottom number is higher, or both.     · You think you may be having side effects from your blood pressure medicine. Where can you learn more? Go to http://www.gray.com/  Enter [id number] in the search box to learn more about \"High Blood Pressure: Care Instructions. \"  Current as of: December 16, 2019               Content Version: 12.6  © 1061-3982 [employer]. Care instructions adapted under license by [employer] (which disclaims liability or warranty for this information). If you have questions about a medical condition or this instruction, always ask your healthcare professional. [address] any warranty or liability for your use of this information.

## 2020-09-27 LAB
ATRIAL RATE: 120 BPM
CALCULATED P AXIS, ECG09: 59 DEGREES
CALCULATED R AXIS, ECG10: -17 DEGREES
CALCULATED T AXIS, ECG11: 42 DEGREES
DIAGNOSIS, 93000: NORMAL
P-R INTERVAL, ECG05: 152 MS
Q-T INTERVAL, ECG07: 328 MS
QRS DURATION, ECG06: 88 MS
QTC CALCULATION (BEZET), ECG08: 463 MS
VENTRICULAR RATE, ECG03: 120 BPM

## 2020-09-28 ENCOUNTER — HOSPITAL ENCOUNTER (EMERGENCY)
Age: 45
Discharge: HOME | End: 2020-09-28
Attending: EMERGENCY MEDICINE
Payer: MEDICAID

## 2020-09-28 VITALS
OXYGEN SATURATION: 99 % | RESPIRATION RATE: 16 BRPM | HEART RATE: 89 BPM | DIASTOLIC BLOOD PRESSURE: 89 MMHG | SYSTOLIC BLOOD PRESSURE: 169 MMHG | TEMPERATURE: 98.6 F

## 2020-09-28 DIAGNOSIS — M54.42 LEFT-SIDED LOW BACK PAIN WITH LEFT-SIDED SCIATICA, UNSPECIFIED CHRONICITY: Primary | ICD-10-CM

## 2020-09-28 LAB
ALBUMIN SERPL-MCNC: 3.4 G/DL (ref 3.4–5)
ALBUMIN/GLOB SERPL: 1 {RATIO} (ref 0.8–1.7)
ALP SERPL-CCNC: 76 U/L (ref 45–117)
ALT SERPL-CCNC: 16 U/L (ref 13–56)
ANION GAP SERPL CALC-SCNC: 7 MMOL/L (ref 3–18)
APPEARANCE UR: ABNORMAL
AST SERPL-CCNC: 12 U/L (ref 10–38)
BACTERIA URNS QL MICRO: ABNORMAL /HPF
BASOPHILS # BLD: 0 K/UL (ref 0–0.1)
BASOPHILS NFR BLD: 0 % (ref 0–2)
BILIRUB SERPL-MCNC: 0.5 MG/DL (ref 0.2–1)
BILIRUB UR QL: NEGATIVE
BUN SERPL-MCNC: 8 MG/DL (ref 7–18)
BUN/CREAT SERPL: 12 (ref 12–20)
CALCIUM SERPL-MCNC: 8.7 MG/DL (ref 8.5–10.1)
CHLORIDE SERPL-SCNC: 101 MMOL/L (ref 100–111)
CO2 SERPL-SCNC: 28 MMOL/L (ref 21–32)
COLOR UR: YELLOW
CREAT SERPL-MCNC: 0.67 MG/DL (ref 0.6–1.3)
DIFFERENTIAL METHOD BLD: ABNORMAL
EOSINOPHIL # BLD: 0 K/UL (ref 0–0.4)
EOSINOPHIL NFR BLD: 1 % (ref 0–5)
EPITH CASTS URNS QL MICRO: ABNORMAL /LPF (ref 0–5)
ERYTHROCYTE [DISTWIDTH] IN BLOOD BY AUTOMATED COUNT: 14.4 % (ref 11.6–14.5)
GLOBULIN SER CALC-MCNC: 3.5 G/DL (ref 2–4)
GLUCOSE SERPL-MCNC: 273 MG/DL (ref 74–99)
GLUCOSE UR STRIP.AUTO-MCNC: >1000 MG/DL
HCG UR QL: NEGATIVE
HCT VFR BLD AUTO: 36.2 % (ref 35–45)
HGB BLD-MCNC: 11.1 G/DL (ref 12–16)
HGB UR QL STRIP: ABNORMAL
KETONES UR QL STRIP.AUTO: 40 MG/DL
LEUKOCYTE ESTERASE UR QL STRIP.AUTO: ABNORMAL
LYMPHOCYTES # BLD: 2 K/UL (ref 0.9–3.6)
LYMPHOCYTES NFR BLD: 40 % (ref 21–52)
MCH RBC QN AUTO: 23.3 PG (ref 24–34)
MCHC RBC AUTO-ENTMCNC: 30.7 G/DL (ref 31–37)
MCV RBC AUTO: 75.9 FL (ref 74–97)
MONOCYTES # BLD: 0.3 K/UL (ref 0.05–1.2)
MONOCYTES NFR BLD: 7 % (ref 3–10)
NEUTS SEG # BLD: 2.6 K/UL (ref 1.8–8)
NEUTS SEG NFR BLD: 52 % (ref 40–73)
NITRITE UR QL STRIP.AUTO: NEGATIVE
PH UR STRIP: 5 [PH] (ref 5–8)
PLATELET # BLD AUTO: 270 K/UL (ref 135–420)
PMV BLD AUTO: 10.5 FL (ref 9.2–11.8)
POTASSIUM SERPL-SCNC: 3 MMOL/L (ref 3.5–5.5)
PROT SERPL-MCNC: 6.9 G/DL (ref 6.4–8.2)
PROT UR STRIP-MCNC: NEGATIVE MG/DL
RBC # BLD AUTO: 4.77 M/UL (ref 4.2–5.3)
RBC #/AREA URNS HPF: ABNORMAL /HPF (ref 0–5)
SODIUM SERPL-SCNC: 136 MMOL/L (ref 136–145)
SP GR UR REFRACTOMETRY: >1.03 (ref 1–1.03)
TRICHOMONAS UR QL MICRO: ABNORMAL
TROPONIN I SERPL-MCNC: <0.02 NG/ML (ref 0–0.04)
UROBILINOGEN UR QL STRIP.AUTO: 1 EU/DL (ref 0.2–1)
WBC # BLD AUTO: 5 K/UL (ref 4.6–13.2)
WBC URNS QL MICRO: ABNORMAL /HPF (ref 0–5)

## 2020-09-28 PROCEDURE — 74011250636 HC RX REV CODE- 250/636: Performed by: FAMILY MEDICINE

## 2020-09-28 PROCEDURE — 74011250636 HC RX REV CODE- 250/636: Performed by: EMERGENCY MEDICINE

## 2020-09-28 PROCEDURE — 81001 URINALYSIS AUTO W/SCOPE: CPT

## 2020-09-28 PROCEDURE — 81025 URINE PREGNANCY TEST: CPT

## 2020-09-28 PROCEDURE — 74011250637 HC RX REV CODE- 250/637: Performed by: EMERGENCY MEDICINE

## 2020-09-28 PROCEDURE — 96375 TX/PRO/DX INJ NEW DRUG ADDON: CPT

## 2020-09-28 PROCEDURE — 85025 COMPLETE CBC W/AUTO DIFF WBC: CPT

## 2020-09-28 PROCEDURE — 84484 ASSAY OF TROPONIN QUANT: CPT

## 2020-09-28 PROCEDURE — 99285 EMERGENCY DEPT VISIT HI MDM: CPT

## 2020-09-28 PROCEDURE — 96374 THER/PROPH/DIAG INJ IV PUSH: CPT

## 2020-09-28 PROCEDURE — 80053 COMPREHEN METABOLIC PANEL: CPT

## 2020-09-28 PROCEDURE — 93005 ELECTROCARDIOGRAM TRACING: CPT

## 2020-09-28 RX ORDER — POTASSIUM CHLORIDE 20 MEQ/1
40 TABLET, EXTENDED RELEASE ORAL
Status: COMPLETED | OUTPATIENT
Start: 2020-09-28 | End: 2020-09-28

## 2020-09-28 RX ORDER — MORPHINE SULFATE 4 MG/ML
4 INJECTION INTRAVENOUS ONCE
Status: COMPLETED | OUTPATIENT
Start: 2020-09-28 | End: 2020-09-28

## 2020-09-28 RX ORDER — SODIUM CHLORIDE 9 MG/ML
1000 INJECTION, SOLUTION INTRAVENOUS ONCE
Status: COMPLETED | OUTPATIENT
Start: 2020-09-28 | End: 2020-09-28

## 2020-09-28 RX ORDER — ONDANSETRON 2 MG/ML
4 INJECTION INTRAMUSCULAR; INTRAVENOUS ONCE
Status: COMPLETED | OUTPATIENT
Start: 2020-09-28 | End: 2020-09-28

## 2020-09-28 RX ORDER — OXYCODONE AND ACETAMINOPHEN 5; 325 MG/1; MG/1
1 TABLET ORAL
Qty: 12 TAB | Refills: 0 | Status: SHIPPED | OUTPATIENT
Start: 2020-09-28 | End: 2020-10-01

## 2020-09-28 RX ORDER — LABETALOL HCL 20 MG/4 ML
20 SYRINGE (ML) INTRAVENOUS ONCE
Status: COMPLETED | OUTPATIENT
Start: 2020-09-28 | End: 2020-09-28

## 2020-09-28 RX ADMIN — POTASSIUM CHLORIDE 40 MEQ: 1500 TABLET, EXTENDED RELEASE ORAL at 05:45

## 2020-09-28 RX ADMIN — MORPHINE SULFATE 4 MG: 4 INJECTION INTRAVENOUS at 07:13

## 2020-09-28 RX ADMIN — ONDANSETRON 4 MG: 2 INJECTION INTRAMUSCULAR; INTRAVENOUS at 07:13

## 2020-09-28 RX ADMIN — LABETALOL HYDROCHLORIDE 20 MG: 5 INJECTION, SOLUTION INTRAVENOUS at 05:36

## 2020-09-28 RX ADMIN — SODIUM CHLORIDE 1000 ML: 900 INJECTION, SOLUTION INTRAVENOUS at 07:13

## 2020-09-28 NOTE — ED PROVIDER NOTES
EMERGENCY DEPARTMENT HISTORY AND PHYSICAL EXAM    Date: 9/28/2020  Patient Name: Criss Guido    History of Presenting Illness     No chief complaint on file. History Provided By: Patient      Criss Guido is a 54 y.o. female with PMHX of subarachnoid hemorrhage, hypertension, residual weakness who presents to the emergency department C/O back pain radiating down left leg. Patient notes these are the exact same symptoms she had when she was seen here 4 days ago. She notes that her symptoms got better while she was in the emergency department but came back while she was at home and the [facility] is not covering it. She also notes that she is not taking her blood pressure medication. Patient at that time had a CTA of the chest abdomen and pelvis which showed no evidence of AAA or dissection. When pressed on what changed today that brought her back into the emergency department she states that nothing is changed over the last 4 days that her pain is still present and is a tingling and burning and stabbing sensation going down her right leg and that she could not sleep. She denies headache she denies changes in vision she denies neck pain or stiffness she denies chest pain or shortness of breath she denies abdominal pain or nausea she denies change in her baseline weakness. Dae Urbina PCP: Johanny Guerin MD    Current Outpatient Medications   Medication Sig Dispense Refill    oxyCODONE-acetaminophen (Percocet) 5-325 mg per tablet Take 1 Tab by mouth every six (6) hours as needed for Pain for up to 3 days. Max Daily Amount: 4 Tabs. 12 Tab 0    lidocaine (Lidoderm) 5 % Apply patch to the affected area for 12 hours a day and remove for 12 hours a day. 15 Each 0    atorvastatin (LIPITOR) 40 mg tablet Take 40 mg by mouth daily.  amLODIPine (NORVASC) 10 mg tablet Take 10 mg by mouth daily.  hydroCHLOROthiazide (HYDRODIURIL) 25 mg tablet Take 25 mg by mouth daily.       metFORMIN (GLUCOPHAGE) 500 mg tablet Take 500 mg by mouth two (2) times a day.  lisinopriL (PRINIVIL, ZESTRIL) 20 mg tablet Take 20 mg by mouth daily.  gabapentin (NEURONTIN) 300 mg capsule Take 300 mg by mouth three (3) times daily.  metoprolol tartrate (LOPRESSOR) 50 mg tablet Take 50 mg by mouth two (2) times a day.  insulin glargine (LANTUS,BASAGLAR) 100 unit/mL (3 mL) inpn 10 Units by SubCUTAneous route daily.  glipiZIDE (GLUCOTROL) 10 mg tablet Take 1 Tab by mouth Before breakfast and dinner. 60 Tab 0    aspirin delayed-release 81 mg tablet Take 1 Tab by mouth daily. 30 Tab 3       Past History     Past Medical History:  Past Medical History:   Diagnosis Date    Diabetes ([facility] [address]     Hypercholesteremia     Hypertension     [facility]roke [facility])        Past Surgical History:  Past Surgical History:   Procedure Laterality Date    HX GYN      c section       Family History:  No family history on file. Social History:  Social History     Tobacco Use    Smoking status: Never Smoker    Smokeless tobacco: Never Used   Substance Use Topics    Alcohol use: Yes     Comment: socially    Drug use: No       Allergies: Allergies   Allergen Reactions    Ibuprofen Unknown (comments)     Raises blood pressure up         Review of Systems   Review of Systems   All other systems reviewed and are negative.         Physical Exam     Vitals:    09/28/20 0500 09/28/20 0530 09/28/20 0534 09/28/20 0536   BP: (!) 203/125 (!) 194/137 (!) 201/117 (!) 201/117   Pulse:    (!) 102   Resp:       SpO2:  100% 99%      Physical Exam    Nursing notes and vital signs reviewed    Constitutional: Non toxic appearing, no acute distress  Head: Normocephalic, Atraumatic  Eyes: Pupils are equal, round, and reactive to light, EOMI  Neck: Supple  Cardiovascular: Regular rate and rhythm, no murmurs, rubs, or gallops  Chest: Normal work of breathing and chest excursion bilaterally  Lungs: Clear to ausculation bilaterally  Abdomen: Soft, non tender, non distended, normoactive bowel sounds  Back: No evidence of trauma or deformity  Extremities: No evidence of trauma or deformity, no LE edema  Skin: Warm and dry, normal cap refill  Neuro: Alert and appropriate, CN intact, normal speech, strength and sensation at patient's baseline bilaterally, normal gait, normal coordination  Psychiatric: Normal mood and affect      Diagnostic Study Results     Labs -     Recent Results (from the past 12 hour(s))   EKG, 12 LEAD, INITIAL    Collection Time: 09/28/20  4:38 AM   Result Value Ref Range    Ventricular Rate 91 BPM    Atrial Rate 91 BPM    P-R Interval 180 ms    QRS Duration 88 ms    Q-T Interval 402 ms    QTC Calculation (Bezet) 494 ms    Calculated P Axis 62 degrees    Calculated R Axis -4 degrees    Calculated T Axis 46 degrees    Diagnosis       Normal sinus rhythm  Possible Left atrial enlargement  Anterior infarct (cited on or before 21-DEC-2018)  Abnormal ECG  When compared with ECG of 24-SEP-2020 04:23,  No significant change was found     CBC WITH AUTOMATED DIFF    Collection Time: 09/28/20  4:39 AM   Result Value Ref Range    WBC 5.0 4.6 - 13.2 K/uL    RBC 4.77 4.20 - 5.30 M/uL    HGB 11.1 (L) 12.0 - 16.0 g/dL    HCT 36.2 35.0 - 45.0 %    MCV 75.9 74.0 - 97.0 FL    MCH 23.3 (L) 24.0 - 34.0 PG    MCHC 30.7 (L) 31.0 - 37.0 g/dL    RDW 14.4 11.6 - 14.5 %    PLATELET 441 260 - 649 K/uL    MPV 10.5 9.2 - 11.8 FL    NEUTROPHILS 52 40 - 73 %    LYMPHOCYTES 40 21 - 52 %    MONOCYTES 7 3 - 10 %    EOSINOPHILS 1 0 - 5 %    BASOPHILS 0 0 - 2 %    ABS. NEUTROPHILS 2.6 1.8 - 8.0 K/UL    ABS. LYMPHOCYTES 2.0 0.9 - 3.6 K/UL    ABS. MONOCYTES 0.3 0.05 - 1.2 K/UL    ABS. EOSINOPHILS 0.0 0.0 - 0.4 K/UL    ABS.  BASOPHILS 0.0 0.0 - 0.1 K/UL    DF AUTOMATED     METABOLIC PANEL, COMPREHENSIVE    Collection Time: 09/28/20  4:39 AM   Result Value Ref Range    Sodium 136 136 - 145 mmol/L    Potassium 3.0 (L) 3.5 - 5.5 mmol/L    Chloride 101 100 - 111 mmol/L    CO2 28 21 - 32 mmol/L Anion gap 7 3.0 - 18 mmol/L    Glucose 273 (H) 74 - 99 mg/dL    BUN 8 7.0 - 18 MG/DL    Creatinine 0.67 0.6 - 1.3 MG/DL    BUN/Creatinine ratio 12 12 - 20      GFR est AA >60 >60 ml/min/1.73m2    GFR est non-AA >60 >60 ml/min/1.73m2    Calcium 8.7 8.5 - 10.1 MG/DL    Bilirubin, total 0.5 0.2 - 1.0 MG/DL    ALT (SGPT) 16 13 - 56 U/L    AST (SGOT) 12 10 - 38 U/L    Alk. phosphatase 76 45 - 117 U/L    Protein, total 6.9 6.4 - 8.2 g/dL    Albumin 3.4 3.4 - 5.0 g/dL    Globulin 3.5 2.0 - 4.0 g/dL    A-G Ratio 1.0 0.8 - 1.7     TROPONIN I    Collection Time: 09/28/20  4:39 AM   Result Value Ref Range    Troponin-I, QT <0.02 0.0 - 0.045 NG/ML   URINALYSIS W/ RFLX MICROSCOPIC    Collection Time: 09/28/20  4:45 AM   Result Value Ref Range    Color YELLOW      Appearance CLOUDY      Specific gravity >1.030 (H) 1.005 - 1.030    pH (UA) 5.0 5.0 - 8.0      Protein Negative NEG mg/dL    Glucose >1,000 (A) NEG mg/dL    Ketone 40 (A) NEG mg/dL    Bilirubin Negative NEG      Blood MODERATE (A) NEG      Urobilinogen 1.0 0.2 - 1.0 EU/dL    Nitrites Negative NEG      Leukocyte Esterase SMALL (A) NEG     HCG URINE, QL    Collection Time: 09/28/20  4:45 AM   Result Value Ref Range    HCG urine, QL Negative NEG     URINE MICROSCOPIC ONLY    Collection Time: 09/28/20  4:45 AM   Result Value Ref Range    WBC 11 to 20 0 - 5 /hpf    RBC 1 to 3 0 - 5 /hpf    Epithelial cells 3+ 0 - 5 /lpf    Bacteria FEW (A) NEG /hpf    Trichomonas 2+ (A) NEG       Radiologic Studies -  No orders to display     CT Results  (Last 48 hours)    None        CXR Results  (Last 48 hours)    None          Medications given in the ED-  Medications   labetaloL (NORMODYNE;TRANDATE) 20 mg/4 mL (5 mg/mL) injection 20 mg (20 mg IntraVENous Given 9/28/20 0536)   potassium chloride (K-DUR, KLOR-CON) SR tablet 40 mEq (40 mEq Oral Given 9/28/20 0545)         Medical Decision Making   I am the first provider for this patient.     I reviewed the vital signs, available nursing notes, past medical history, past surgical history, family history and social history. Vital Signs-Reviewed the patient's vital signs. Records Reviewed: Nursing Notes, Old Medical Records, Previous Radiology Studies and Previous Laboratory Studies    Provider Notes (Medical Decision Making): Nikhil Reddy is a 54 y.o. female who presents today with persistent back pain and sciatica. It is worse with walking with her cane. It radiates down her left leg. She was seen 4 days ago at that time she was evaluated with a CTA for dissection and aneurysm which was negative. She noted that she has had persistent systems since then given that nothing has changed and she had a CTA 4 days ago unlikely to be dissection or aortic aneurysm. I discussed repeat work-up with the patient and family and they noted they were mainly here for pain control. Patient had good pulses in all 4 extremities her blood pressure was treated with labetalol. On direct questioning patient is not taking all of her blood pressure medication she does have them at home and will take them as soon as she gets there. Her pain was markedly improved with morphine. Laboratory work as above EKG did not show an evidence of a STEMI troponin was negative her potassium was low which was repleted orally. Patient was given follow-up with an orthopedic as she said she has not been able to contact 1 at this point. Patient will be discharged home hemodynamically stable to close follow-up with her PCM and referral to orthopedics. She was given strict return precautions I discussed the finding and plan with  and patient who understood and agreed    Procedures:  Procedures      Diagnosis and Disposition       DISCHARGE NOTE:    Rosa Herring  results have been reviewed with her. She has been counseled regarding her diagnosis, treatment, and plan.   She verbally conveys understanding and agreement of the signs, symptoms, diagnosis, treatment and prognosis and additionally agrees to follow up as discussed. She also agrees with the care-plan and conveys that all of her questions have been answered. I have also provided discharge instructions for her that include: educational information regarding their diagnosis and treatment, and list of reasons why they would want to return to the ED prior to their follow-up appointment, should her condition change. She has been provided with education for proper emergency department utilization. CLINICAL IMPRESSION:    1. Left-sided low back pain with left-sided sciatica, unspecified chronicity        PLAN:  1. D/C Home  2. Current Discharge Medication List      START taking these medications    Details   oxyCODONE-acetaminophen (Percocet) 5-325 mg per tablet Take 1 Tab by mouth every six (6) hours as needed for Pain for up to 3 days. Max Daily Amount: 4 Tabs. Qty: 12 Tab, Refills: 0    Associated Diagnoses: Left-sided low back pain with left-sided sciatica, unspecified chronicity           3. Follow-up Information     Follow up With Specialties Details Why Silvina Samuels MD Internal Medicine Schedule an appointment as soon as possible for a visit in 2 days  [address] [place] [address]      Felipa Goldman MD Orthopedic Surgery Schedule an appointment as soon as possible for a visit in 1 week  [address]      THE [facility] EMERGENCY DEPT Emergency Medicine  As needed, If symptoms worsen [address] [address]  [phone]        _______________________________      Please note that this dictation was completed with [employer], the computer voice recognition software. Quite often unanticipated grammatical, syntax, homophones, and other interpretive errors are inadvertently transcribed by the computer software. Please disregard these errors.   Please excuse any errors that have escaped final proofreading.

## 2020-09-28 NOTE — ED NOTES
The documentation for this period is being entered following the guidelines as defined in the [facility] policy by Ottoniel Castano.

## 2020-09-28 NOTE — DISCHARGE INSTRUCTIONS

## 2020-09-28 NOTE — ED NOTES
I have reviewed discharge instructions with the patient. The patient verbalized understanding.   Arm band removed s/o driving her home ambulatory with out difficulty from ed

## 2020-10-01 LAB
ATRIAL RATE: 91 BPM
CALCULATED P AXIS, ECG09: 62 DEGREES
CALCULATED R AXIS, ECG10: -4 DEGREES
CALCULATED T AXIS, ECG11: 46 DEGREES
DIAGNOSIS, 93000: NORMAL
P-R INTERVAL, ECG05: 180 MS
Q-T INTERVAL, ECG07: 402 MS
QRS DURATION, ECG06: 88 MS
QTC CALCULATION (BEZET), ECG08: 494 MS
VENTRICULAR RATE, ECG03: 91 BPM